# Patient Record
Sex: FEMALE | Race: WHITE | Employment: FULL TIME | URBAN - METROPOLITAN AREA
[De-identification: names, ages, dates, MRNs, and addresses within clinical notes are randomized per-mention and may not be internally consistent; named-entity substitution may affect disease eponyms.]

---

## 2017-05-14 ENCOUNTER — HOSPITAL ENCOUNTER (EMERGENCY)
Age: 47
Discharge: HOME | End: 2017-05-14
Payer: MEDICAID

## 2017-05-14 ENCOUNTER — APPOINTMENT (OUTPATIENT)
Dept: GENERAL RADIOLOGY | Age: 47
End: 2017-05-14
Payer: MEDICAID

## 2017-05-14 VITALS
BODY MASS INDEX: 23.79 KG/M2 | HEART RATE: 83 BPM | RESPIRATION RATE: 16 BRPM | OXYGEN SATURATION: 97 % | WEIGHT: 151.6 LBS | TEMPERATURE: 99.4 F | SYSTOLIC BLOOD PRESSURE: 130 MMHG | DIASTOLIC BLOOD PRESSURE: 70 MMHG | HEIGHT: 67 IN

## 2017-05-14 DIAGNOSIS — F31.9 BIPOLAR 1 DISORDER (HCC): ICD-10-CM

## 2017-05-14 DIAGNOSIS — S60.221A CONTUSION OF RIGHT HAND, INITIAL ENCOUNTER: Primary | ICD-10-CM

## 2017-05-14 LAB
ALBUMIN SERPL-MCNC: 4.8 G/DL (ref 3.9–4.9)
ALP BLD-CCNC: 46 U/L (ref 40–130)
ALT SERPL-CCNC: 17 U/L (ref 0–33)
AMPHETAMINE SCREEN, URINE: ABNORMAL
ANION GAP SERPL CALCULATED.3IONS-SCNC: 14 MEQ/L (ref 7–13)
AST SERPL-CCNC: 31 U/L (ref 0–35)
BACTERIA: ABNORMAL /HPF
BARBITURATE SCREEN URINE: ABNORMAL
BASOPHILS ABSOLUTE: 0.1 K/UL (ref 0–0.2)
BASOPHILS RELATIVE PERCENT: 0.6 %
BENZODIAZEPINE SCREEN, URINE: ABNORMAL
BILIRUB SERPL-MCNC: 0.3 MG/DL (ref 0–1.2)
BILIRUBIN URINE: NEGATIVE
BLOOD, URINE: ABNORMAL
BUN BLDV-MCNC: 7 MG/DL (ref 6–20)
CALCIUM SERPL-MCNC: 9.6 MG/DL (ref 8.6–10.2)
CANNABINOID SCREEN URINE: POSITIVE
CHLORIDE BLD-SCNC: 101 MEQ/L (ref 98–107)
CK MB: 7.6 NG/ML (ref 0–3.8)
CLARITY: CLEAR
CO2: 23 MEQ/L (ref 22–29)
COCAINE METABOLITE SCREEN URINE: ABNORMAL
COLOR: YELLOW
CREAT SERPL-MCNC: 0.48 MG/DL (ref 0.5–0.9)
CREATINE KINASE-MB INDEX: 1.2 % (ref 0–3.5)
EOSINOPHILS ABSOLUTE: 0.1 K/UL (ref 0–0.7)
EOSINOPHILS RELATIVE PERCENT: 1.1 %
EPITHELIAL CELLS, UA: ABNORMAL /HPF
ETHANOL PERCENT: NORMAL G/DL
ETHANOL: <10 MG/DL (ref 0–0.08)
GFR AFRICAN AMERICAN: >60
GFR NON-AFRICAN AMERICAN: >60
GLOBULIN: 2.1 G/DL (ref 2.3–3.5)
GLUCOSE BLD-MCNC: 112 MG/DL (ref 74–109)
GLUCOSE URINE: NEGATIVE MG/DL
HCG(URINE) PREGNANCY TEST: NEGATIVE
HCT VFR BLD CALC: 40 % (ref 37–47)
HEMOGLOBIN: 13.2 G/DL (ref 12–16)
KETONES, URINE: ABNORMAL MG/DL
LEUKOCYTE ESTERASE, URINE: ABNORMAL
LYMPHOCYTES ABSOLUTE: 1.7 K/UL (ref 1–4.8)
LYMPHOCYTES RELATIVE PERCENT: 14.8 %
Lab: ABNORMAL
MCH RBC QN AUTO: 29.5 PG (ref 27–31.3)
MCHC RBC AUTO-ENTMCNC: 33.1 % (ref 33–37)
MCV RBC AUTO: 89.2 FL (ref 82–100)
MONOCYTES ABSOLUTE: 1 K/UL (ref 0.2–0.8)
MONOCYTES RELATIVE PERCENT: 8.8 %
MUCUS: PRESENT
NEUTROPHILS ABSOLUTE: 8.8 K/UL (ref 1.4–6.5)
NEUTROPHILS RELATIVE PERCENT: 74.7 %
NITRITE, URINE: NEGATIVE
OPIATE SCREEN URINE: ABNORMAL
PDW BLD-RTO: 15.3 % (ref 11.5–14.5)
PH UA: 6 (ref 5–9)
PHENCYCLIDINE SCREEN URINE: ABNORMAL
PLATELET # BLD: 212 K/UL (ref 130–400)
POTASSIUM SERPL-SCNC: 3.6 MEQ/L (ref 3.5–5.1)
PROTEIN UA: NEGATIVE MG/DL
RBC # BLD: 4.48 M/UL (ref 4.2–5.4)
RBC UA: ABNORMAL /HPF (ref 0–2)
SODIUM BLD-SCNC: 138 MEQ/L (ref 132–144)
SPECIFIC GRAVITY UA: 1.01 (ref 1–1.03)
TOTAL CK: 660 U/L (ref 0–170)
TOTAL PROTEIN: 6.9 G/DL (ref 6.4–8.1)
TSH SERPL DL<=0.05 MIU/L-ACNC: 2.43 UIU/ML (ref 0.27–4.2)
URINE REFLEX TO CULTURE: YES
UROBILINOGEN, URINE: 0.2 E.U./DL
WBC # BLD: 11.8 K/UL (ref 4.8–10.8)
WBC UA: ABNORMAL /HPF (ref 0–5)

## 2017-05-14 PROCEDURE — 99283 EMERGENCY DEPT VISIT LOW MDM: CPT

## 2017-05-14 PROCEDURE — 82553 CREATINE MB FRACTION: CPT

## 2017-05-14 PROCEDURE — 81001 URINALYSIS AUTO W/SCOPE: CPT

## 2017-05-14 PROCEDURE — 80053 COMPREHEN METABOLIC PANEL: CPT

## 2017-05-14 PROCEDURE — 85025 COMPLETE CBC W/AUTO DIFF WBC: CPT

## 2017-05-14 PROCEDURE — 84703 CHORIONIC GONADOTROPIN ASSAY: CPT

## 2017-05-14 PROCEDURE — G0480 DRUG TEST DEF 1-7 CLASSES: HCPCS

## 2017-05-14 PROCEDURE — 84443 ASSAY THYROID STIM HORMONE: CPT

## 2017-05-14 PROCEDURE — 87086 URINE CULTURE/COLONY COUNT: CPT

## 2017-05-14 PROCEDURE — 73130 X-RAY EXAM OF HAND: CPT

## 2017-05-14 PROCEDURE — 82550 ASSAY OF CK (CPK): CPT

## 2017-05-14 PROCEDURE — 80307 DRUG TEST PRSMV CHEM ANLYZR: CPT

## 2017-05-14 PROCEDURE — 36415 COLL VENOUS BLD VENIPUNCTURE: CPT

## 2017-05-14 ASSESSMENT — ENCOUNTER SYMPTOMS
ALLERGIC/IMMUNOLOGIC NEGATIVE: 1
ABDOMINAL PAIN: 0
COLOR CHANGE: 0
EYE PAIN: 0
SHORTNESS OF BREATH: 0
TROUBLE SWALLOWING: 0
APNEA: 0

## 2017-05-14 ASSESSMENT — PAIN DESCRIPTION - LOCATION: LOCATION: HAND

## 2017-05-14 ASSESSMENT — PAIN DESCRIPTION - ORIENTATION: ORIENTATION: RIGHT

## 2017-05-14 ASSESSMENT — PAIN DESCRIPTION - PAIN TYPE: TYPE: ACUTE PAIN

## 2017-05-14 ASSESSMENT — PAIN SCALES - GENERAL: PAINLEVEL_OUTOF10: 5

## 2017-05-16 LAB — URINE CULTURE, ROUTINE: NORMAL

## 2017-05-18 ENCOUNTER — OFFICE VISIT (OUTPATIENT)
Dept: FAMILY MEDICINE CLINIC | Age: 47
End: 2017-05-18

## 2017-05-18 VITALS
BODY MASS INDEX: 23.7 KG/M2 | OXYGEN SATURATION: 97 % | DIASTOLIC BLOOD PRESSURE: 68 MMHG | SYSTOLIC BLOOD PRESSURE: 122 MMHG | WEIGHT: 151 LBS | HEIGHT: 67 IN | HEART RATE: 95 BPM | TEMPERATURE: 97.5 F

## 2017-05-18 DIAGNOSIS — Z01.419 ENCOUNTER FOR GYNECOLOGICAL EXAMINATION WITH PAPANICOLAOU SMEAR OF CERVIX: ICD-10-CM

## 2017-05-18 DIAGNOSIS — N89.8 VAGINAL ITCHING: ICD-10-CM

## 2017-05-18 DIAGNOSIS — F31.9 BIPOLAR 1 DISORDER (HCC): Primary | ICD-10-CM

## 2017-05-18 LAB
CLUE CELLS: NORMAL
TRICHOMONAS PREP: NORMAL
TRICHOMONAS VAGINALIS SCREEN: NEGATIVE
YEAST WET PREP: NORMAL

## 2017-05-18 PROCEDURE — 99212 OFFICE O/P EST SF 10 MIN: CPT | Performed by: NURSE PRACTITIONER

## 2017-05-18 RX ORDER — QUETIAPINE FUMARATE 25 MG/1
25 TABLET, FILM COATED ORAL 2 TIMES DAILY
Qty: 60 TABLET | Refills: 3 | Status: SHIPPED | OUTPATIENT
Start: 2017-05-18 | End: 2017-09-15 | Stop reason: SDUPTHER

## 2017-05-18 ASSESSMENT — ENCOUNTER SYMPTOMS: COUGH: 0

## 2017-05-25 LAB
C. TRACHOMATIS DNA,THIN PREP: NEGATIVE
N. GONORRHOEAE DNA, THIN PREP: NEGATIVE

## 2017-05-31 LAB
HPV COMMENT: NORMAL
HPV TYPE 16: NOT DETECTED
HPV TYPE 18: NOT DETECTED
HPVOH (OTHER TYPES): NOT DETECTED

## 2017-06-08 ENCOUNTER — OFFICE VISIT (OUTPATIENT)
Dept: FAMILY MEDICINE CLINIC | Age: 47
End: 2017-06-08

## 2017-06-08 VITALS
TEMPERATURE: 98.4 F | HEART RATE: 75 BPM | OXYGEN SATURATION: 98 % | DIASTOLIC BLOOD PRESSURE: 58 MMHG | BODY MASS INDEX: 23.7 KG/M2 | SYSTOLIC BLOOD PRESSURE: 100 MMHG | WEIGHT: 151 LBS | HEIGHT: 67 IN

## 2017-06-08 DIAGNOSIS — Z12.39 BREAST CANCER SCREENING: ICD-10-CM

## 2017-06-08 DIAGNOSIS — F31.9 BIPOLAR 1 DISORDER (HCC): Primary | ICD-10-CM

## 2017-06-08 PROCEDURE — 99213 OFFICE O/P EST LOW 20 MIN: CPT | Performed by: NURSE PRACTITIONER

## 2017-06-08 ASSESSMENT — ENCOUNTER SYMPTOMS
COUGH: 0
SHORTNESS OF BREATH: 0

## 2017-06-23 ENCOUNTER — HOSPITAL ENCOUNTER (OUTPATIENT)
Dept: WOMENS IMAGING | Age: 47
Discharge: HOME | End: 2017-06-23
Payer: COMMERCIAL

## 2017-06-23 DIAGNOSIS — Z12.39 BREAST CANCER SCREENING: ICD-10-CM

## 2017-06-23 PROCEDURE — G0202 SCR MAMMO BI INCL CAD: HCPCS

## 2017-06-27 ENCOUNTER — TELEPHONE (OUTPATIENT)
Dept: FAMILY MEDICINE CLINIC | Age: 47
End: 2017-06-27

## 2017-06-27 DIAGNOSIS — R92.8 ABNORMALITY OF BOTH BREASTS ON SCREENING MAMMOGRAM: Primary | ICD-10-CM

## 2017-06-29 ENCOUNTER — HOSPITAL ENCOUNTER (OUTPATIENT)
Dept: WOMENS IMAGING | Age: 47
Discharge: HOME | End: 2017-06-29
Payer: COMMERCIAL

## 2017-06-29 ENCOUNTER — HOSPITAL ENCOUNTER (OUTPATIENT)
Dept: ULTRASOUND IMAGING | Age: 47
Discharge: HOME | End: 2017-06-29
Payer: COMMERCIAL

## 2017-06-29 DIAGNOSIS — R92.8 ABNORMALITY OF BOTH BREASTS ON SCREENING MAMMOGRAM: ICD-10-CM

## 2017-06-29 PROCEDURE — 76642 ULTRASOUND BREAST LIMITED: CPT

## 2017-06-29 PROCEDURE — G0204 DX MAMMO INCL CAD BI: HCPCS

## 2017-09-15 DIAGNOSIS — F31.9 BIPOLAR 1 DISORDER (HCC): ICD-10-CM

## 2017-09-15 RX ORDER — QUETIAPINE FUMARATE 25 MG/1
25 TABLET, FILM COATED ORAL 2 TIMES DAILY
Qty: 60 TABLET | Refills: 0 | Status: SHIPPED | OUTPATIENT
Start: 2017-09-15 | End: 2017-10-23 | Stop reason: SDUPTHER

## 2017-09-19 ENCOUNTER — TELEPHONE (OUTPATIENT)
Dept: FAMILY MEDICINE CLINIC | Age: 47
End: 2017-09-19

## 2017-10-23 DIAGNOSIS — F31.9 BIPOLAR 1 DISORDER (HCC): ICD-10-CM

## 2017-10-23 RX ORDER — QUETIAPINE FUMARATE 25 MG/1
25 TABLET, FILM COATED ORAL 2 TIMES DAILY
Qty: 60 TABLET | Refills: 0 | Status: SHIPPED | OUTPATIENT
Start: 2017-10-23 | End: 2017-11-16 | Stop reason: SDUPTHER

## 2017-11-16 DIAGNOSIS — F31.9 BIPOLAR 1 DISORDER (HCC): ICD-10-CM

## 2017-11-17 RX ORDER — QUETIAPINE FUMARATE 25 MG/1
25 TABLET, FILM COATED ORAL 2 TIMES DAILY
Qty: 60 TABLET | Refills: 0 | Status: SHIPPED | OUTPATIENT
Start: 2017-11-17 | End: 2018-09-27 | Stop reason: DRUGHIGH

## 2018-09-27 ENCOUNTER — OFFICE VISIT (OUTPATIENT)
Dept: FAMILY MEDICINE CLINIC | Age: 48
End: 2018-09-27

## 2018-09-27 VITALS
SYSTOLIC BLOOD PRESSURE: 104 MMHG | BODY MASS INDEX: 24.96 KG/M2 | WEIGHT: 159 LBS | HEART RATE: 88 BPM | OXYGEN SATURATION: 98 % | TEMPERATURE: 97.8 F | HEIGHT: 67 IN | DIASTOLIC BLOOD PRESSURE: 62 MMHG

## 2018-09-27 DIAGNOSIS — J01.40 ACUTE NON-RECURRENT PANSINUSITIS: Primary | ICD-10-CM

## 2018-09-27 DIAGNOSIS — S29.012A STRAIN OF MUSCLE AND TENDON OF BACK WALL OF THORAX, INITIAL ENCOUNTER: ICD-10-CM

## 2018-09-27 PROCEDURE — 99213 OFFICE O/P EST LOW 20 MIN: CPT | Performed by: NURSE PRACTITIONER

## 2018-09-27 RX ORDER — METHYLPREDNISOLONE 4 MG/1
TABLET ORAL
Qty: 21 TABLET | Refills: 0 | Status: SHIPPED | OUTPATIENT
Start: 2018-09-27 | End: 2018-10-03

## 2018-09-27 RX ORDER — DULOXETIN HYDROCHLORIDE 30 MG/1
30 CAPSULE, DELAYED RELEASE ORAL DAILY
COMMUNITY
End: 2021-10-08

## 2018-09-27 RX ORDER — AMOXICILLIN AND CLAVULANATE POTASSIUM 875; 125 MG/1; MG/1
1 TABLET, FILM COATED ORAL 2 TIMES DAILY
Qty: 20 TABLET | Refills: 0 | Status: SHIPPED | OUTPATIENT
Start: 2018-09-27 | End: 2018-10-07

## 2018-09-27 RX ORDER — QUETIAPINE FUMARATE 50 MG/1
50 TABLET, EXTENDED RELEASE ORAL DAILY
COMMUNITY
End: 2018-12-20 | Stop reason: ALTCHOICE

## 2018-09-27 RX ORDER — DULOXETIN HYDROCHLORIDE 60 MG/1
90 CAPSULE, DELAYED RELEASE ORAL DAILY
COMMUNITY

## 2018-09-27 RX ORDER — CYCLOBENZAPRINE HCL 10 MG
10 TABLET ORAL NIGHTLY PRN
Qty: 10 TABLET | Refills: 0 | Status: SHIPPED | OUTPATIENT
Start: 2018-09-27 | End: 2018-10-07

## 2018-09-27 ASSESSMENT — ENCOUNTER SYMPTOMS
WHEEZING: 0
SHORTNESS OF BREATH: 0
COUGH: 1
RHINORRHEA: 1
BACK PAIN: 1
BOWEL INCONTINENCE: 0

## 2018-09-27 ASSESSMENT — PATIENT HEALTH QUESTIONNAIRE - PHQ9
SUM OF ALL RESPONSES TO PHQ QUESTIONS 1-9: 0
1. LITTLE INTEREST OR PLEASURE IN DOING THINGS: 0
SUM OF ALL RESPONSES TO PHQ9 QUESTIONS 1 & 2: 0
2. FEELING DOWN, DEPRESSED OR HOPELESS: 0
SUM OF ALL RESPONSES TO PHQ QUESTIONS 1-9: 0

## 2018-09-27 NOTE — PROGRESS NOTES
Subjective  Chief Complaint   Patient presents with    Cough     states that she has had the cough about a week. was trying sinus/cold medication with no relief.  Back Pain     states that she hurt her back at home yesterday lifting a window up. states that right now she is having pain between her shoulder blades. states that she used heat last night and it is not helping did not take any meds.  Other     passed depression screening. Cough   This is a new problem. The current episode started in the past 7 days (started 1 week ago after dusting and going through old stuff in her parent's house). The problem has been unchanged. The problem occurs constantly. The cough is non-productive. Associated symptoms include nasal congestion, postnasal drip and rhinorrhea. Pertinent negatives include no chest pain, chills, fever, shortness of breath or wheezing. The symptoms are aggravated by dust. Risk factors for lung disease include smoking/tobacco exposure. Treatments tried: sinus/cold medicine. The treatment provided mild relief. Back Pain   This is a new problem. The current episode started yesterday. The problem occurs constantly. The problem is unchanged. The pain is present in the thoracic spine. The pain does not radiate (muscle pain between shoulder blades, left collar bone area). Exacerbated by: lifting arms overhead. Pertinent negatives include no bladder incontinence, bowel incontinence, chest pain, fever, leg pain, paresis, paresthesias or weakness. She has tried heat (hot shower and heating pad) for the symptoms. The treatment provided moderate relief.            Past Medical History:   Diagnosis Date    [facility]29[facility]2016    Hemangioma 2016    ct abdomen, left lobe    Liver lesion, right lobe 2007, 2016    stable between 2 readings     Patient Active Problem List    Diagnosis Date Noted    Bipolar 1 disorder ([facility] 06/29/2016    Liver lesion, right lobe     Hemangioma 88   Temp: 97.8 °F (36.6 °C)   TempSrc: Tympanic   SpO2: 98%   Weight: 159 lb (72.1 kg)   Height: 5' 7.25\" (1.708 m)     Physical Exam   Constitutional: She is oriented to person, place, and time. She appears well-developed and well-nourished. No distress. HENT:   Head: Normocephalic and atraumatic. Right Ear: Tympanic membrane, external ear and ear canal normal.   Left Ear: Tympanic membrane, external ear and ear canal normal.   Nose: Mucosal edema present. Right sinus exhibits maxillary sinus tenderness and frontal sinus tenderness. Left sinus exhibits maxillary sinus tenderness and frontal sinus tenderness. Mouth/Throat: Oropharynx is clear and moist. No oropharyngeal exudate. Eyes: Pupils are equal, round, and reactive to light. Conjunctivae are normal.   Neck: Normal range of motion. Neck supple. Cardiovascular: Normal rate, regular rhythm and normal heart sounds. Pulmonary/Chest: Effort normal and breath sounds normal. No respiratory distress. Musculoskeletal:        Thoracic back: She exhibits decreased range of motion, tenderness (muscular), pain and spasm. She exhibits no bony tenderness, no swelling, no edema, no deformity, no laceration and normal pulse. Lymphadenopathy:     She has no cervical adenopathy. Neurological: She is alert and oriented to person, place, and time. No cranial nerve deficit. Skin: Skin is warm and dry. No rash noted. She is not diaphoretic. No erythema. No pallor. Psychiatric: She has a normal mood and affect. Her behavior is normal. Judgment and thought content normal.       Assessment & Plan    1. Acute non-recurrent pansinusitis  Pt advised to rest and drink plenty of fluids. Finish all antibiotics even if feeling better. OTC analgesics for symptom management. Salt water gargle for sore throat. RTO if symptoms worsen or if no improvement in 72 hours. - amoxicillin-clavulanate (AUGMENTIN) 875-125 MG per tablet;  Take 1 tablet by mouth 2 times daily for 10 days  Dispense: 20 tablet; Refill: 0    2. Strain of muscle and tendon of back wall of thorax, initial encounter  Medrol dosepack as ordered. Muscle relaxer PRN sparingly. Ice to area PRN. Stretching exercises. Avoid bedrest.  No heavy lifting. F/u if no improvement. - methylPREDNISolone (MEDROL DOSEPACK) 4 MG tablet; Take by mouth. Dispense: 21 tablet; Refill: 0  - cyclobenzaprine (FLEXERIL) 10 MG tablet; Take 1 tablet by mouth nightly as needed for Muscle spasms  Dispense: 10 tablet; Refill: 0    Side effects, adverse effects of the medication prescribed today, as well as treatment plan/ rationale and result expectations have been discussed with the patient who expresses understanding and desires to proceed. Close follow up to evaluate treatment results and for coordination of care. I have reviewed the patient's medical history in detail and updated the computerized patient record. As always, patient is advised that if symptoms worsen in any way they will proceed to the nearest emergency room. No orders of the defined types were placed in this encounter. Orders Placed This Encounter   Medications    amoxicillin-clavulanate (AUGMENTIN) 875-125 MG per tablet     Sig: Take 1 tablet by mouth 2 times daily for 10 days     Dispense:  20 tablet     Refill:  0    methylPREDNISolone (MEDROL DOSEPACK) 4 MG tablet     Sig: Take by mouth. Dispense:  21 tablet     Refill:  0    cyclobenzaprine (FLEXERIL) 10 MG tablet     Sig: Take 1 tablet by mouth nightly as needed for Muscle spasms     Dispense:  10 tablet     Refill:  0       Return if symptoms worsen or fail to improve.     LUZ Cox - CNP

## 2018-10-11 ENCOUNTER — OFFICE VISIT (OUTPATIENT)
Dept: FAMILY MEDICINE CLINIC | Age: 48
End: 2018-10-11

## 2018-10-11 VITALS
DIASTOLIC BLOOD PRESSURE: 62 MMHG | BODY MASS INDEX: 25.43 KG/M2 | HEIGHT: 67 IN | OXYGEN SATURATION: 99 % | WEIGHT: 162 LBS | SYSTOLIC BLOOD PRESSURE: 108 MMHG | HEART RATE: 86 BPM

## 2018-10-11 DIAGNOSIS — M54.6 MIDLINE THORACIC BACK PAIN, UNSPECIFIED CHRONICITY: ICD-10-CM

## 2018-10-11 PROCEDURE — 99213 OFFICE O/P EST LOW 20 MIN: CPT | Performed by: FAMILY MEDICINE

## 2018-10-11 RX ORDER — IBUPROFEN 200 MG
200 TABLET ORAL 4 TIMES DAILY PRN
COMMUNITY
End: 2019-10-31

## 2018-10-11 ASSESSMENT — ENCOUNTER SYMPTOMS
BACK PAIN: 1
ABDOMINAL PAIN: 0
BOWEL INCONTINENCE: 0

## 2018-10-11 NOTE — PATIENT INSTRUCTIONS
Patient Education        Healthy Upper Back: Exercises  Your Care Instructions  Here are some examples of exercises for your upper back. Start each exercise slowly. Ease off the exercise if you start to have pain. Your doctor or physical therapist will tell you when you can start these exercises and which ones will work best for you. How to do the exercises  Lower neck and upper back stretch    1. Stretch your arms out in front of your body. Clasp one hand on top of your other hand. 2. Gently reach out so that you feel your shoulder blades stretching away from each other. 3. Gently bend your head forward. 4. Hold for 15 to 30 seconds. 5. Repeat 2 to 4 times. Midback stretch    If you have knee pain, do not do this exercise. 1. Kneel on the floor, and sit back on your ankles. 2. Lean forward, place your hands on the floor, and stretch your arms out in front of you. Rest your head between your arms. 3. Gently push your chest toward the floor, reaching as far in front of you as possible. 4. Hold for 15 to 30 seconds. 5. Repeat 2 to 4 times. Shoulder rolls    1. Sit comfortably with your feet shoulder-width apart. You can also do this exercise while standing. 2. Roll your shoulders up, then back, and then down in a smooth, circular motion. 3. Repeat 2 to 4 times. Wall push-up    1. Stand against a wall with your feet about 12 to 24 inches back from the wall. If you feel any pain when you do this exercise, stand closer to the wall. 2. Place your hands on the wall slightly wider apart than your shoulders, and lean forward. 3. Gently lean your body toward the wall. Then push back to your starting position. Keep the motion smooth and controlled. 4. Repeat 8 to 12 times. Resisted shoulder blade squeeze    For this exercise, you will need elastic exercise material, such as surgical tubing or Thera-Band. 1. Sit or stand, holding the band in both hands in front of you.  Keep your elbows close to your sides, bent at a 90-degree angle. Your palms should face up. 2. Squeeze your shoulder blades together, and move your arms to the outside, stretching the band. Be sure to keep your elbows at your sides while you do this. 3. Relax. 4. Repeat 8 to 12 times. Resisted rows    For this exercise, you will need elastic exercise material, such as surgical tubing or Thera-Band. 1. Put the band around a solid object, such as a bedpost, at about waist level. Hold one end of the band in each hand. 2. With your elbows at your sides and bent to 90 degrees, pull the band back to move your shoulder blades toward each other. Return to the starting position. 3. Repeat 8 to 12 times. Follow-up care is a key part of your treatment and safety. Be sure to make and go to all appointments, and call your doctor if you are having problems. It's also a good idea to know your test results and keep a list of the medicines you take. Where can you learn more? Go to [email or web address] org and sign in to your [employer] account. Enter V778 in the Snocap box to learn more about \"Healthy Upper Back: Exercises. \"     If you do not have an account, please click on the \"Sign Up Now\" link. Current as of: November 29, 2017  Content Version: 11.7  © 8228-4296 [employer]. Care instructions adapted under license by [facility] [place] ([facility]). If you have questions about a medical condition or this instruction, always ask your healthcare professional. [place] [address] any warranty or liability for your use of this information.

## 2018-10-11 NOTE — PROGRESS NOTES
Subjective  Lorene Carlos Sheikh, 50 y.o. female presents today with:  Chief Complaint   Patient presents with    Back Pain     between should blades/ hurts after work when she tries to relax       Back Pain   This is a new problem. The current episode started 1 to 4 weeks ago. The problem occurs intermittently. The problem has been waxing and waning since onset. The pain is present in the thoracic spine. The quality of the pain is described as burning and stabbing. The pain does not radiate. The pain is at a severity of 10/10. The pain is severe. Exacerbated by: movement above her head. Pertinent negatives include no abdominal pain, bladder incontinence, bowel incontinence, chest pain or dysuria. She has tried NSAIDs for the symptoms. The treatment provided no relief. Mid back pain started end of Sept. Saw Margret Lyman end of Sept and was given rx for MDP but did not take it. Took muscle relaxer but not helping.,     Review of Systems   Cardiovascular: Negative for chest pain. Gastrointestinal: Negative for abdominal pain and bowel incontinence. Genitourinary: Negative for bladder incontinence and dysuria. Musculoskeletal: Positive for back pain. Past Medical History:   Diagnosis Date    Bipolar 1 disorder ([facility] 6/29/2016    Hemangioma 2016    ct abdomen, left lobe    Liver lesion, right lobe 2007, 2016    stable between 2 readings    Thoracic back pain      Past Surgical History:   Procedure Laterality Date    APPENDECTOMY  6/10/16    laparoscopic Dr Spann Bias COLONOSCOPY  06/27/2016    w/bx     CYST REMOVAL      DENTAL SURGERY      NECK SURGERY      TUBAL LIGATION       Social History     Social History    Marital status:      Spouse name: N/A    Number of children: N/A    Years of education: N/A     Occupational History    Not on file.      Social History Main Topics    Smoking status: Current Every Day Smoker     Packs/day: 1.00     Types: Cigarettes    Smokeless tobacco: at this time. She was given home back exercises to do. She will call in 2 weeks if not better and will proceed with imaging. Needs work excuse for no overhead lifting or pushing/lifting over 25 pounds at all for 2 weeks. No orders of the defined types were placed in this encounter. No orders of the defined types were placed in this encounter. There are no discontinued medications. Return if symptoms worsen or fail to improve.     Carlton Cruz MD

## 2018-12-20 ENCOUNTER — OFFICE VISIT (OUTPATIENT)
Dept: FAMILY MEDICINE CLINIC | Age: 48
End: 2018-12-20

## 2018-12-20 VITALS
DIASTOLIC BLOOD PRESSURE: 70 MMHG | TEMPERATURE: 97.9 F | HEART RATE: 92 BPM | WEIGHT: 162.5 LBS | SYSTOLIC BLOOD PRESSURE: 110 MMHG | RESPIRATION RATE: 15 BRPM | HEIGHT: 67 IN | BODY MASS INDEX: 25.51 KG/M2 | OXYGEN SATURATION: 98 %

## 2018-12-20 DIAGNOSIS — R00.1 BRADYCARDIA: ICD-10-CM

## 2018-12-20 DIAGNOSIS — R00.1 BRADYCARDIA: Primary | ICD-10-CM

## 2018-12-20 LAB — TSH REFLEX: 1.97 UIU/ML (ref 0.27–4.2)

## 2018-12-20 PROCEDURE — 99214 OFFICE O/P EST MOD 30 MIN: CPT | Performed by: INTERNAL MEDICINE

## 2018-12-20 PROCEDURE — 93000 ELECTROCARDIOGRAM COMPLETE: CPT | Performed by: INTERNAL MEDICINE

## 2018-12-20 RX ORDER — ARIPIPRAZOLE 5 MG/1
5 TABLET ORAL DAILY
COMMUNITY
End: 2019-10-31

## 2018-12-20 ASSESSMENT — ENCOUNTER SYMPTOMS
ABDOMINAL PAIN: 0
BACK PAIN: 0
SHORTNESS OF BREATH: 0
EYE PAIN: 0

## 2018-12-20 NOTE — PROGRESS NOTES
Subjective:      Patient ID: Marce Baumann is a 50 y.o. female who presents today with:  Chief Complaint   Patient presents with    Bradycardia     low heart rate (38, 42, 41) readings from Tuesday, patient states she feels ok, no chest pain, no dizziness. HPI    Here because she had an episode of bradycardia at [facility]. She endorses fatigue. But no lightheadedness, dizziness, passing out, or near passing out. She felt \"fine\" when her pulse was in the 40's. This was taken both manually and through the pulse oximetry. No new medications or changes. THC, and alcohol use, but no other illicit drug use. Past Medical History:   Diagnosis Date    Bipolar 1 disorder ([facility] 6/29/2016    Hemangioma 2016    ct abdomen, left lobe    Liver lesion, right lobe 2007, 2016    stable between 2 readings    Thoracic back pain      Past Surgical History:   Procedure Laterality Date    APPENDECTOMY  6/10/16    laparoscopic Dr Hortencia Rosario COLONOSCOPY  06/27/2016    w/bx     CYST REMOVAL      DENTAL SURGERY      NECK SURGERY      TUBAL LIGATION       Social History     Social History    Marital status:      Spouse name: N/A    Number of children: N/A    Years of education: N/A     Occupational History    Not on file.      Social History Main Topics    Smoking status: Current Every Day Smoker     Packs/day: 1.00     Types: Cigarettes    Smokeless tobacco: Never Used    Alcohol use Yes      Comment: occasionally    Drug use: Yes     Types: Marijuana    Sexual activity: Not on file     Other Topics Concern    Not on file     Social History Narrative    No narrative on file     Allergies   Allergen Reactions    Demerol     Sulfa Antibiotics      Current Outpatient Prescriptions on File Prior to Visit   Medication Sig Dispense Refill    ibuprofen (ADVIL;MOTRIN) 200 MG tablet Take 200 mg by mouth 4 times daily as needed for Pain      DULoxetine (CYMBALTA) 60 MG extended release capsule Take 60 mg by mouth daily      DULoxetine (CYMBALTA) 30 MG extended release capsule Take 30 mg by mouth daily      Multiple Vitamins-Minerals (MULTI-VITAMIN GUMMIES PO) Take by mouth       No current facility-administered medications on file prior to visit. I have personally reviewed the ROS, PMH, PFH, and social history     Review of Systems   Constitutional: Negative for chills and fever. HENT: Negative for congestion. Eyes: Negative for pain. Respiratory: Negative for shortness of breath. Cardiovascular: Negative for chest pain. Gastrointestinal: Negative for abdominal pain. Genitourinary: Negative for hematuria. Musculoskeletal: Negative for back pain. Allergic/Immunologic: Negative for immunocompromised state. Neurological: Negative for headaches. Psychiatric/Behavioral: Negative for hallucinations. Objective:   /70 (Site: Left Upper Arm, Position: Sitting, Cuff Size: Large Adult)   Pulse 92   Temp 97.9 °F (36.6 °C) (Tympanic)   Resp 15   Ht 5' 7\" (1.702 m)   Wt 162 lb 8 oz (73.7 kg)   LMP 12/01/2018 (Approximate)   SpO2 98%   BMI 25.45 kg/m²     Physical Exam   Constitutional: She is oriented to person, place, and time. She appears well-developed and well-nourished. HENT:   Head: Normocephalic. Eyes: Pupils are equal, round, and reactive to light. Neck: No tracheal deviation present. Cardiovascular: Normal rate, regular rhythm and normal heart sounds. Exam reveals no gallop and no friction rub. No murmur heard. Pulmonary/Chest: No respiratory distress. Abdominal: Soft. Bowel sounds are normal. She exhibits no distension. There is no rebound. Musculoskeletal: She exhibits no edema. Neurological: She is oriented to person, place, and time. Skin: Skin is warm and dry. Assessment:       Diagnosis Orders   1.  Bradycardia  [address] [facility]    TSH with Reflex    EKG 12 Lead         Plan:    Given lack of insurance will refer to cardiology to see what the most cost effective step is. If any red flag symptoms (cp, dyspnea, passing out, near passing out, lightheadedness, etc, go to ER)  She understands  Check TSH. She's asx. Orders Placed This Encounter   Procedures    TSH with Reflex     Standing Status:   Future     Standing Expiration Date:   12/20/2019   Moy Suazo, [address] [facility]     Referral Priority:   Routine     Referral Type:   Eval and Treat     Referral Reason:   Specialty Services Required     Referred to Provider:   Torrie Lawrence MD     Requested Specialty:   Interventional Cardiology     Number of Visits Requested:   1    EKG 12 Lead     Order Specific Question:   Reason for Exam?     Answer:   Irregular heart rate     No orders of the defined types were placed in this encounter. No Follow-up on file. Sheree Aguilar MD    If anything should change or worsen call ASAP, don't wait for next scheduled appointment.

## 2018-12-26 ENCOUNTER — OFFICE VISIT (OUTPATIENT)
Dept: CARDIOLOGY CLINIC | Age: 48
End: 2018-12-26

## 2018-12-26 VITALS
HEIGHT: 67 IN | WEIGHT: 164.4 LBS | BODY MASS INDEX: 25.8 KG/M2 | DIASTOLIC BLOOD PRESSURE: 70 MMHG | HEART RATE: 77 BPM | SYSTOLIC BLOOD PRESSURE: 120 MMHG | OXYGEN SATURATION: 98 %

## 2018-12-26 DIAGNOSIS — R00.1 BRADYCARDIA: Primary | ICD-10-CM

## 2018-12-26 PROCEDURE — 99244 OFF/OP CNSLTJ NEW/EST MOD 40: CPT | Performed by: INTERNAL MEDICINE

## 2019-01-01 ASSESSMENT — ENCOUNTER SYMPTOMS
CHEST TIGHTNESS: 0
CHOKING: 0
ABDOMINAL DISTENTION: 0
BACK PAIN: 0
ABDOMINAL PAIN: 0
APNEA: 0
COLOR CHANGE: 0

## 2019-02-22 ENCOUNTER — OFFICE VISIT (OUTPATIENT)
Dept: FAMILY MEDICINE CLINIC | Age: 49
End: 2019-02-22

## 2019-02-22 VITALS
DIASTOLIC BLOOD PRESSURE: 72 MMHG | HEART RATE: 69 BPM | SYSTOLIC BLOOD PRESSURE: 118 MMHG | TEMPERATURE: 98 F | WEIGHT: 167 LBS | HEIGHT: 67 IN | BODY MASS INDEX: 26.21 KG/M2 | OXYGEN SATURATION: 97 %

## 2019-02-22 DIAGNOSIS — J01.00 ACUTE MAXILLARY SINUSITIS, RECURRENCE NOT SPECIFIED: Primary | ICD-10-CM

## 2019-02-22 PROCEDURE — 99212 OFFICE O/P EST SF 10 MIN: CPT | Performed by: NURSE PRACTITIONER

## 2019-02-22 RX ORDER — AMOXICILLIN AND CLAVULANATE POTASSIUM 875; 125 MG/1; MG/1
1 TABLET, FILM COATED ORAL 2 TIMES DAILY
Qty: 20 TABLET | Refills: 0 | Status: SHIPPED | OUTPATIENT
Start: 2019-02-22 | End: 2019-03-04

## 2019-02-22 ASSESSMENT — ENCOUNTER SYMPTOMS
SINUS PAIN: 1
HEMOPTYSIS: 0
SINUS PRESSURE: 1
SHORTNESS OF BREATH: 1
COUGH: 1
CHEST TIGHTNESS: 0
WHEEZING: 0
SORE THROAT: 0
HEARTBURN: 0
RHINORRHEA: 1

## 2019-10-31 ENCOUNTER — OFFICE VISIT (OUTPATIENT)
Dept: FAMILY MEDICINE CLINIC | Age: 49
End: 2019-10-31

## 2019-10-31 VITALS
SYSTOLIC BLOOD PRESSURE: 114 MMHG | DIASTOLIC BLOOD PRESSURE: 60 MMHG | BODY MASS INDEX: 26.12 KG/M2 | WEIGHT: 166.4 LBS | HEART RATE: 98 BPM | RESPIRATION RATE: 14 BRPM | HEIGHT: 67 IN | OXYGEN SATURATION: 96 % | TEMPERATURE: 98.8 F

## 2019-10-31 DIAGNOSIS — N93.8 DUB (DYSFUNCTIONAL UTERINE BLEEDING): ICD-10-CM

## 2019-10-31 DIAGNOSIS — N92.1 MENOMETRORRHAGIA: Primary | ICD-10-CM

## 2019-10-31 PROCEDURE — 99213 OFFICE O/P EST LOW 20 MIN: CPT | Performed by: NURSE PRACTITIONER

## 2019-10-31 ASSESSMENT — ENCOUNTER SYMPTOMS
DIARRHEA: 0
SHORTNESS OF BREATH: 0
ABDOMINAL DISTENTION: 0
CONSTIPATION: 0
COUGH: 0
ABDOMINAL PAIN: 0

## 2019-11-01 ENCOUNTER — TELEPHONE (OUTPATIENT)
Dept: FAMILY MEDICINE CLINIC | Age: 49
End: 2019-11-01

## 2019-11-01 ENCOUNTER — HOSPITAL ENCOUNTER (OUTPATIENT)
Dept: ULTRASOUND IMAGING | Age: 49
Discharge: HOME | End: 2019-11-03

## 2019-11-01 DIAGNOSIS — N92.1 MENOMETRORRHAGIA: Primary | ICD-10-CM

## 2019-11-01 DIAGNOSIS — N92.1 MENOMETRORRHAGIA: ICD-10-CM

## 2019-11-01 DIAGNOSIS — N93.8 DUB (DYSFUNCTIONAL UTERINE BLEEDING): ICD-10-CM

## 2019-11-01 PROCEDURE — 76830 TRANSVAGINAL US NON-OB: CPT

## 2019-11-01 PROCEDURE — 76856 US EXAM PELVIC COMPLETE: CPT

## 2019-11-05 DIAGNOSIS — D25.9 UTERINE LEIOMYOMA, UNSPECIFIED LOCATION: Primary | ICD-10-CM

## 2019-11-05 DIAGNOSIS — N93.8 DUB (DYSFUNCTIONAL UTERINE BLEEDING): ICD-10-CM

## 2019-11-05 DIAGNOSIS — N83.209 CYST OF OVARY, UNSPECIFIED LATERALITY: ICD-10-CM

## 2019-12-06 ENCOUNTER — OFFICE VISIT (OUTPATIENT)
Dept: OBGYN CLINIC | Age: 49
End: 2019-12-06

## 2019-12-06 VITALS — BODY MASS INDEX: 25.53 KG/M2 | DIASTOLIC BLOOD PRESSURE: 70 MMHG | WEIGHT: 163 LBS | SYSTOLIC BLOOD PRESSURE: 122 MMHG

## 2019-12-06 DIAGNOSIS — Z12.31 SCREENING MAMMOGRAM, ENCOUNTER FOR: ICD-10-CM

## 2019-12-06 DIAGNOSIS — D25.9 UTERINE LEIOMYOMA, UNSPECIFIED LOCATION: ICD-10-CM

## 2019-12-06 DIAGNOSIS — N83.202 LEFT OVARIAN CYST: Primary | ICD-10-CM

## 2019-12-06 PROCEDURE — 99203 OFFICE O/P NEW LOW 30 MIN: CPT | Performed by: OBSTETRICS & GYNECOLOGY

## 2019-12-06 ASSESSMENT — ENCOUNTER SYMPTOMS
BLOOD IN STOOL: 0
VOMITING: 0
RECTAL PAIN: 0
ANAL BLEEDING: 0
DIARRHEA: 0
ABDOMINAL PAIN: 0
ALLERGIC/IMMUNOLOGIC NEGATIVE: 1
CONSTIPATION: 0
NAUSEA: 0
ABDOMINAL DISTENTION: 0
EYES NEGATIVE: 1
RESPIRATORY NEGATIVE: 1

## 2020-06-03 ENCOUNTER — TELEMEDICINE (OUTPATIENT)
Dept: FAMILY MEDICINE CLINIC | Age: 50
End: 2020-06-03

## 2020-06-03 VITALS — TEMPERATURE: 97.7 F | HEIGHT: 67 IN | WEIGHT: 150 LBS | BODY MASS INDEX: 23.54 KG/M2

## 2020-06-03 PROCEDURE — 99442 PR PHYS/QHP TELEPHONE EVALUATION 11-20 MIN: CPT | Performed by: NURSE PRACTITIONER

## 2020-06-03 RX ORDER — AMOXICILLIN AND CLAVULANATE POTASSIUM 875; 125 MG/1; MG/1
1 TABLET, FILM COATED ORAL 2 TIMES DAILY
Qty: 14 TABLET | Refills: 0 | Status: SHIPPED | OUTPATIENT
Start: 2020-06-03 | End: 2020-06-10

## 2020-06-03 ASSESSMENT — ENCOUNTER SYMPTOMS
RHINORRHEA: 1
SINUS PAIN: 1
SORE THROAT: 0
PHOTOPHOBIA: 0
BACK PAIN: 0
COUGH: 1
CHEST TIGHTNESS: 0
WHEEZING: 1
COLOR CHANGE: 0
SHORTNESS OF BREATH: 1

## 2020-06-03 NOTE — PROGRESS NOTES
file     Physically abused: Not on file     Forced sexual activity: Not on file   Other Topics Concern    Not on file   Social History Narrative    Not on file     No family history on file. Allergies:  Demerol; Meperidine; and Sulfa antibiotics    Review of Systems   Constitutional: Negative for appetite change, chills, diaphoresis, fatigue and fever. HENT: Positive for congestion, rhinorrhea and sinus pain. Negative for sore throat. Eyes: Negative for photophobia and visual disturbance. Respiratory: Positive for cough (productive yellow), shortness of breath (a little bit when talking) and wheezing. Negative for chest tightness. Cardiovascular: Negative for chest pain, palpitations and leg swelling. Endocrine: Negative for polydipsia, polyphagia and polyuria. Genitourinary: Negative for difficulty urinating and dysuria. Musculoskeletal: Negative for arthralgias and back pain. Skin: Negative for color change and rash. Neurological: Positive for headaches. Negative for dizziness. Psychiatric/Behavioral: Negative for dysphoric mood. The patient is not nervous/anxious. PHYSICAL EXAM/ RESULTS    Temp 97.7 °F (36.5 °C)   Ht 5' 7\" (1.702 m)   Wt 150 lb (68 kg)   LMP 05/07/2020 (Approximate)   BMI 23.49 kg/m²     Vitals signs: pt does not have the proper equipment to take all VS.    The physical is not performed due to the visit being a telephone counter as indicated due to the restrictions of the COVID-19 pandemic    No results found for this or any previous visit (from the past 2016 hour(s)). Assessment:       Diagnosis Orders   1. Acute upper respiratory infection  amoxicillin-clavulanate (AUGMENTIN) 875-125 MG per tablet     Pt advised to rest and drink plenty of fluids. Finish all antibiotics even if feeling better. OTC analgesics for symptom management. Salt water gargle for sore throat. RTO if symptoms worsen or if no improvement in 72 hours.     Off work for the rest of the week. F/u if no improvement. Will get mammogram done as previously ordered. No orders of the defined types were placed in this encounter. Plan:   Return if symptoms worsen or fail to improve. There are no Patient Instructions on file for this visit. This visit ended at 929.     LUZ Gallagher, RO-C.

## 2020-06-19 ENCOUNTER — TELEPHONE (OUTPATIENT)
Dept: FAMILY MEDICINE CLINIC | Age: 50
End: 2020-06-19

## 2020-06-19 RX ORDER — FLUCONAZOLE 150 MG/1
150 TABLET ORAL ONCE
Qty: 1 TABLET | Refills: 1 | Status: SHIPPED | OUTPATIENT
Start: 2020-06-19 | End: 2020-06-19

## 2020-07-31 ENCOUNTER — OFFICE VISIT (OUTPATIENT)
Dept: FAMILY MEDICINE CLINIC | Age: 50
End: 2020-07-31

## 2020-07-31 VITALS
WEIGHT: 162 LBS | DIASTOLIC BLOOD PRESSURE: 76 MMHG | TEMPERATURE: 98 F | HEART RATE: 75 BPM | HEIGHT: 67 IN | OXYGEN SATURATION: 98 % | SYSTOLIC BLOOD PRESSURE: 130 MMHG | BODY MASS INDEX: 25.43 KG/M2

## 2020-07-31 PROCEDURE — 99386 PREV VISIT NEW AGE 40-64: CPT | Performed by: NURSE PRACTITIONER

## 2020-07-31 ASSESSMENT — ENCOUNTER SYMPTOMS
COUGH: 0
CONSTIPATION: 0
CHEST TIGHTNESS: 0
ABDOMINAL PAIN: 0
BACK PAIN: 0
DIARRHEA: 0
EYE PAIN: 0
TROUBLE SWALLOWING: 0
SHORTNESS OF BREATH: 0

## 2020-07-31 NOTE — PROGRESS NOTES
Subjective  Chief Complaint   Patient presents with    Gynecologic Exam     states that she spots all the time and may have an infection.  Hand Injury     states that she injured her hand at home getting out of the shower. she slipped and fell 7/26/20. thinks that she heperextended it.  Health Maintenance     has order for mammo       HPI    Patient is perimenopausal- No LMP recorded. She complains of irregular menses, vaginal discharge- yellow tinged, vulvar/vaginal itching and vulvar/vaginal irritation/pain. Menopausal/perimenopausal symptoms: none. Hormone therapy side effects: not applicable. Preventive Care and Risk Factor Assessment  Health Maintenance   Topic Date Due    BREAST CANCER SCREENING  04/23/2014    TETANUS VACCINE ADULT (11 YEARS AND UP)  05/23/2014    FLU VACCINE YEARLY (ADULT)  09/01/2014    DEXA SCAN WOMEN  04/23/2015    CERVICAL CANCER SCREENING  04/15/2016    COLON CANCER SCREENING COLONOSCOPY  01/09/2018    ZOSTAVAX VACCINE  Completed        Hx abnormal PAP: yes - ascus  Sexual activity: has sex with males. Hx of STD: no  Hx of abnormal mammogram: no  Self-breast exams: yes  FH of breast cancer: no  FH of GYN cancer: no   Previous DEXA scan: no, not postmenopausal  Exercise: very active regularly    [place] coming out of the shower and hyperextended her hand. Everything feels okay now except her thumb. Has full ROM but does still have some pain and swelling. No insurance so does not want to pursue any further work up.       Past Medical History:   Diagnosis Date    Bipolar 1 disorder ([address] 6/29/2016    Hemangioma 2016    ct abdomen, left lobe    Liver lesion, right lobe 2007, 2016    stable between 2 readings    Thoracic back pain      Patient Active Problem List    Diagnosis Date Noted    Thoracic back pain     Bipolar 1 disorder ([address] 06/29/2016    Liver lesion, right lobe     Hemangioma 01/01/2016    Subscapularis tendinitis 03/21/2013    Chest pain 03/21/2013 Past Surgical History:   Procedure Laterality Date    APPENDECTOMY  6/10/16    laparoscopic Dr Alycia Metcalf  06/27/2016    w/bx     CYST REMOVAL      DENTAL SURGERY      NECK SURGERY      TUBAL LIGATION       No family history on file. Social History     Socioeconomic History    Marital status:      Spouse name: None    Number of children: None    Years of education: None    Highest education level: None   Occupational History    None   Social Needs    Financial resource strain: None    Food insecurity     Worry: None     Inability: None    Transportation needs     Medical: None     Non-medical: None   Tobacco Use    Smoking status: Current Every Day Smoker     Packs/day: 1.00     Types: Cigarettes    Smokeless tobacco: Never Used   Substance and Sexual Activity    Alcohol use: Yes     Comment: occasionally    Drug use: Yes     Types: Marijuana    Sexual activity: Not Currently     Partners: Male   Lifestyle    Physical activity     Days per week: None     Minutes per session: None    Stress: None   Relationships    Social connections     Talks on phone: None     Gets together: None     Attends [employer] service: None     Active member of club or organization: None     Attends meetings of clubs or organizations: None     Relationship status: None    Intimate partner violence     Fear of current or ex partner: None     Emotionally abused: None     Physically abused: None     Forced sexual activity: None   Other Topics Concern    None   Social History Narrative    None     Current Outpatient Medications on File Prior to Visit   Medication Sig Dispense Refill    DULoxetine (CYMBALTA) 60 MG extended release capsule Take 60 mg by mouth daily      DULoxetine (CYMBALTA) 30 MG extended release capsule Take 30 mg by mouth daily       No current facility-administered medications on file prior to visit.       Allergies   Allergen Reactions    Demerol     Meperidine Itching    Sulfa Antibiotics        Review of Systems   Constitutional: Negative for activity change, appetite change, chills, diaphoresis and fatigue. HENT: Negative for ear pain, hearing loss and trouble swallowing. Eyes: Negative for pain and visual disturbance. Respiratory: Negative for cough, chest tightness and shortness of breath. Cardiovascular: Negative for chest pain, palpitations and leg swelling. Gastrointestinal: Negative for abdominal pain, constipation and diarrhea. Genitourinary: Positive for vaginal discharge and vaginal pain. Negative for dyspareunia, dysuria, enuresis, flank pain, frequency, genital sores, hematuria, menstrual problem and pelvic pain. Musculoskeletal: Positive for arthralgias (right thumb). Negative for back pain. Neurological: Negative for dizziness and light-headedness. Psychiatric/Behavioral: Negative for dysphoric mood. The patient is not nervous/anxious. Objective  Vitals:    07/31/20 1135   BP: 130/76   Site: Right Upper Arm   Position: Sitting   Cuff Size: Medium Adult   Pulse: 75   Temp: 98 °F (36.7 °C)   TempSrc: Infrared   SpO2: 98%   Weight: 162 lb (73.5 kg)   Height: 5' 7\" (1.702 m)     Physical Exam  Constitutional:       General: She is not in acute distress. Appearance: Normal appearance. She is normal weight. She is not ill-appearing, toxic-appearing or diaphoretic. HENT:      Head: Normocephalic and atraumatic. Right Ear: External ear normal.      Left Ear: External ear normal.   Neck:      Musculoskeletal: Normal range of motion and neck supple. No muscular tenderness. Cardiovascular:      Rate and Rhythm: Normal rate and regular rhythm. Pulses: Normal pulses. Heart sounds: Normal heart sounds. No murmur. Pulmonary:      Effort: Pulmonary effort is normal. No respiratory distress. Breath sounds: Normal breath sounds. No stridor. No wheezing, rhonchi or rales. Chest:      Chest wall: No tenderness. Musculoskeletal: Normal range of motion. Right lower leg: No edema. Left lower leg: No edema. Lymphadenopathy:      Cervical: No cervical adenopathy. Skin:     General: Skin is warm and dry. Capillary Refill: Capillary refill takes less than 2 seconds. Coloration: Skin is not jaundiced or pale. Findings: No bruising, erythema, lesion or rash. Neurological:      General: No focal deficit present. Mental Status: She is alert and oriented to person, place, and time. Mental status is at baseline. Psychiatric:         Mood and Affect: Mood normal.         Behavior: Behavior normal.         Thought Content: Thought content normal.         Judgment: Judgment normal.         Assessment& Plan     Diagnosis Orders   1. Encounter for Papanicolaou smear for cervical cancer screening  PAP SMEAR   2. Vaginal discharge  Wet Prep, Genital   3. Pain of right thumb       Testing as ordered. Will f/u with testing. Will hold off on xray d/t no insurance, conservative treatment with nsaids and rest.  F/u if no improvement. Andrey order reprinted. Side effects, adverse effects of the medication prescribed today, as well as treatment plan/ rationale and result expectations have been discussed with the patient who expresses understanding and desires to proceed. Close follow up to evaluate treatment results and for coordination of care. I have reviewed the patient's medical history in detail and updated the computerized patient record. As always, patient is advised that if symptoms worsen in any way they will proceed to the nearest emergency room. Orders Placed This Encounter   Procedures    Wet Prep, Genital     Standing Status:   Future     Standing Expiration Date:   7/31/2021    PAP SMEAR     Patient History:    Patient's last menstrual period was 07/14/2020.   OBGYN Status: Having periods  Past Surgical History:  6/10/16: APPENDECTOMY      Comment:  laparoscopic  Izzy  06/27/2016: COLONOSCOPY      Comment:  w/bx   No date: CYST REMOVAL  No date: DENTAL SURGERY  No date: NECK SURGERY  No date: TUBAL LIGATION      Social History    Tobacco Use      Smoking status: Current Every Day Smoker        Packs/day: 1.00        Types: Cigarettes      Smokeless tobacco: Never Used       Standing Status:   Future     Standing Expiration Date:   7/31/2021     Order Specific Question:   Collection Type     Answer:   Imaged Thin Prep     Order Specific Question:   Prior Abnormal Pap Test     Answer:   No     Order Specific Question:   Screening or Diagnostic     Answer:   Screening     Order Specific Question:   Additional STD Testing     Answer:   GC and Chlamydia     Order Specific Question:   Diagnosis Code for Additional STD Testing     Answer:   Screen for STD (sexually transmitted disease) (Z11.3)     Order Specific Question:   HPV Requested? Answer:   HPV Co-Test     Order Specific Question:   High Risk Patient     Answer:   N/A       No orders of the defined types were placed in this encounter. Return if symptoms worsen or fail to improve.     Willa Caraballo, APRN - CNP

## 2020-08-01 DIAGNOSIS — Z12.4 ENCOUNTER FOR PAPANICOLAOU SMEAR FOR CERVICAL CANCER SCREENING: ICD-10-CM

## 2020-08-01 DIAGNOSIS — N89.8 VAGINAL DISCHARGE: ICD-10-CM

## 2020-08-01 LAB
CLUE CELLS: ABNORMAL
TRICHOMONAS PREP: ABNORMAL
YEAST WET PREP: ABNORMAL

## 2020-08-04 RX ORDER — METRONIDAZOLE 500 MG/1
500 TABLET ORAL 2 TIMES DAILY
Qty: 14 TABLET | Refills: 0 | Status: SHIPPED | OUTPATIENT
Start: 2020-08-04 | End: 2020-08-11

## 2020-08-07 LAB
C. TRACHOMATIS DNA,THIN PREP: NEGATIVE
N. GONORRHOEAE DNA, THIN PREP: NEGATIVE

## 2020-11-03 ENCOUNTER — VIRTUAL VISIT (OUTPATIENT)
Dept: FAMILY MEDICINE CLINIC | Age: 50
End: 2020-11-03

## 2020-11-03 ENCOUNTER — NURSE ONLY (OUTPATIENT)
Dept: FAMILY MEDICINE CLINIC | Age: 50
End: 2020-11-03

## 2020-11-03 ENCOUNTER — NURSE TRIAGE (OUTPATIENT)
Dept: OTHER | Facility: CLINIC | Age: 50
End: 2020-11-03

## 2020-11-03 DIAGNOSIS — J40 BRONCHITIS: ICD-10-CM

## 2020-11-03 PROCEDURE — 99000 SPECIMEN HANDLING OFFICE-LAB: CPT | Performed by: NURSE PRACTITIONER

## 2020-11-03 PROCEDURE — 99442 PR PHYS/QHP TELEPHONE EVALUATION 11-20 MIN: CPT | Performed by: NURSE PRACTITIONER

## 2020-11-03 RX ORDER — METHYLPREDNISOLONE 4 MG/1
TABLET ORAL
Qty: 21 TABLET | Refills: 0 | Status: SHIPPED | OUTPATIENT
Start: 2020-11-03 | End: 2020-11-09

## 2020-11-03 RX ORDER — AZITHROMYCIN 250 MG/1
250 TABLET, FILM COATED ORAL SEE ADMIN INSTRUCTIONS
Qty: 6 TABLET | Refills: 0 | Status: SHIPPED | OUTPATIENT
Start: 2020-11-03 | End: 2020-11-08

## 2020-11-03 RX ORDER — ARIPIPRAZOLE 2 MG/1
2 TABLET ORAL DAILY
COMMUNITY
End: 2021-10-08

## 2020-11-03 ASSESSMENT — ENCOUNTER SYMPTOMS
CHEST TIGHTNESS: 1
WHEEZING: 1
COUGH: 0
SHORTNESS OF BREATH: 1
SORE THROAT: 1
SINUS PAIN: 1

## 2020-11-03 NOTE — PROGRESS NOTES
11/3/2020    TELEHEALTH EVALUATION -- Audio/Visual (During LCTOB-75 public health emergency)    Due to COVID 19 outbreak, patient's office visit was converted to a virtual visit. Patient was contacted and agreed to proceed with a virtual visit via Telephone Visit  The risks and benefits of converting to a virtual visit were discussed in light of the current infectious disease epidemic. Patient also understood that insurance coverage and co-pays are up to their individual insurance plans. This visit started at 110    HPI:    Randle Stai (:  1970) has requested an audio/video evaluation for the following concern(s):    Symptoms began a couple of weeks ago with sore throat and body aches. Now feeling sob with wheezing. Coughing until throwing up. \"glands\" are swollen. Tongue feels \"big\"   Excessively fatigued. Hard to catch breath. Hot and cold chills. Review of Systems   Constitutional: Positive for fatigue. HENT: Positive for congestion, sinus pain and sore throat. Respiratory: Positive for chest tightness, shortness of breath and wheezing. Negative for cough. Cardiovascular: Negative for chest pain. Prior to Visit Medications    Medication Sig Taking? Authorizing Provider   ARIPiprazole (ABILIFY) 2 MG tablet Take 2 mg by mouth daily Yes Historical Provider, MD   azithromycin (ZITHROMAX) 250 MG tablet Take 1 tablet by mouth See Admin Instructions for 5 days 500mg on day 1 followed by 250mg on days 2 - 5 Yes LUZ Borges CNP   methylPREDNISolone (MEDROL DOSEPACK) 4 MG tablet Take by mouth.  Yes LUZ Borges CNP   DULoxetine (CYMBALTA) 60 MG extended release capsule Take 60 mg by mouth daily Yes Historical Provider, MD   DULoxetine (CYMBALTA) 30 MG extended release capsule Take 30 mg by mouth daily Yes Historical Provider, MD       Social History     Tobacco Use    Smoking status: Current Every Day Smoker     Packs/day: 1.00     Types: Cigarettes    Smokeless tobacco: Never Used   Substance Use Topics    Alcohol use: Yes     Comment: occasionally    Drug use: Yes     Types: Marijuana        Allergies   Allergen Reactions    Demerol     Meperidine Itching    Sulfa Antibiotics    ,   Past Medical History:   Diagnosis Date    Bipolar 1 disorder ([facility] 6/29/2016    Hemangioma 2016    ct abdomen, left lobe    Liver lesion, right lobe 2007, 2016    stable between 2 readings    Thoracic back pain    ,   Past Surgical History:   Procedure Laterality Date    APPENDECTOMY  6/10/16    laparoscopic Dr Milner Scale  06/27/2016    w/bx     CYST REMOVAL      DENTAL SURGERY      NECK SURGERY      TUBAL LIGATION     ,   Social History     Tobacco Use    Smoking status: Current Every Day Smoker     Packs/day: 1.00     Types: Cigarettes    Smokeless tobacco: Never Used   Substance Use Topics    Alcohol use: Yes     Comment: occasionally    Drug use: Yes     Types: Marijuana   , No family history on file. PHYSICAL EXAMINATION:  [ INSTRUCTIONS:  \"[x]\" Indicates a positive item  \"[]\" Indicates a negative item  -- DELETE ALL ITEMS NOT EXAMINED]  [x] Alert  [x] Oriented to person/place/time    [] No apparent distress  [] Toxic appearing    [] Face flushed appearing [] Sclera clear  [] Lips are cyanotic      [] Breathing appears normal  [] Appears tachypneic      [] Rash on visible skin    [] Cranial Nerves II-XII grossly intact    [] Motor grossly intact in visible upper extremities    [] Motor grossly intact in visible lower extremities    [x] Normal Mood  [] Anxious appearing    [] Depressed appearing  [] Confused appearing      [] Poor short term memory  [] Poor long term memory    [] OTHER:      Due to this being a TeleHealth encounter, evaluation of the following organ systems is limited: Vitals/Constitutional/EENT/Resp/CV/GI//MS/Neuro/Skin/Heme-Lymph-Imm. ASSESSMENT/PLAN:  1. Bronchitis    - COVID-19 Ambulatory;  Future  - azithromycin (ZITHROMAX) 250 MG tablet; Take 1 tablet by mouth See Admin Instructions for 5 days 500mg on day 1 followed by 250mg on days 2 - 5  Dispense: 6 tablet; Refill: 0  - methylPREDNISolone (MEDROL DOSEPACK) 4 MG tablet; Take by mouth. Dispense: 21 tablet; Refill: 0      Side effects, adverse effects of the medication prescribed today, as well as treatment plan/ rationale and result expectations have been discussed with the patient who expresses understanding and desires to proceed. Close follow up to evaluate treatment results and for coordination of care. I have reviewed the patient's medical history in detail and updated the computerized patient record. As always, patient is advised that if symptoms worsen in any way they will proceed to the nearest emergency room. This visit ended at 121    An  electronic signature was used to authenticate this note. --LUZ Godfrey - CNP on 11/3/2020 at 1:18 PM        Pursuant to the emergency declaration under the [facility] waiver authority and the [employer] Act, this Virtual  Visit was conducted, with patient's consent, to reduce the patient's risk of exposure to COVID-19 and provide continuity of care for an established patient. Services were provided through a video synchronous discussion virtually to substitute for in-person clinic visit.

## 2020-11-03 NOTE — LETTER
[address]  Phone: [phone]  Fax: [phone]    LUZ Emanuel CNP        November 3, 2020     Patient: Kane Delgado   YOB: 1970   Date of Visit: 11/3/2020       To Whom It May Concern: It is my medical opinion that Neftali Orta was tested for covid today and will be cleared for work upon return of testing and further evaluation. Earliest return would be 11/9/2020. If you have any questions or concerns, please don't hesitate to call.     Sincerely,        LUZ Emanuel CNP

## 2020-11-03 NOTE — TELEPHONE ENCOUNTER
Reason for Disposition   MILD difficulty breathing (e.g., minimal/no SOB at rest, SOB with walking, pulse < 100) of new onset or worse than normal    Answer Assessment - Initial Assessment Questions  1. RESPIRATORY STATUS: \"Describe your breathing? \" (e.g., wheezing, shortness of breath, unable to speak, severe coughing)       SOB    2. ONSET: \"When did this breathing problem begin? \"       Approx 1 week. 3. PATTERN \"Does the difficult breathing come and go, or has it been constant since it started? \"      Intermittent - worse after talking     4. SEVERITY: \"How bad is your breathing? \" (e.g., mild, moderate, severe)     - MILD: No SOB at rest, mild SOB with walking, speaks normally in sentences, can lay down, no retractions, pulse < 100.     - MODERATE: SOB at rest, SOB with minimal exertion and prefers to sit, cannot lie down flat, speaks in phrases, mild retractions, audible wheezing, pulse 100-120.     - SEVERE: Very SOB at rest, speaks in single words, struggling to breathe, sitting hunched forward, retractions, pulse > 120       Mild    5. RECURRENT SYMPTOM: \"Have you had difficulty breathing before? \" If so, ask: \"When was the last time? \" and \"What happened that time? \"       Never had before. 6. CARDIAC HISTORY: \"Do you have any history of heart disease? \" (e.g., heart attack, angina, bypass surgery, angioplasty)       Denies hx - had murmur as a child    7. LUNG HISTORY: \"Do you have any history of lung disease? \"  (e.g., pulmonary embolus, asthma, emphysema)      Denies    8. CAUSE: \"What do you think is causing the breathing problem? \"       Unsure    9. OTHER SYMPTOMS: \"Do you have any other symptoms? (e.g., dizziness, runny nose, cough, chest pain, fever)     Dizziness, SOB, dysphagia, vomiting after severe coughing fits    10. PREGNANCY: \"Is there any chance you are pregnant? \" \"When was your last menstrual period? \"        N/A    11. TRAVEL: \"Have you traveled out of the country in the last month? \"

## 2020-11-04 LAB
SARS-COV-2: NOT DETECTED
SOURCE: NORMAL

## 2020-12-07 ENCOUNTER — NURSE ONLY (OUTPATIENT)
Dept: FAMILY MEDICINE CLINIC | Age: 50
End: 2020-12-07

## 2020-12-07 ENCOUNTER — VIRTUAL VISIT (OUTPATIENT)
Dept: FAMILY MEDICINE CLINIC | Age: 50
End: 2020-12-07

## 2020-12-07 DIAGNOSIS — Z20.822 ENCOUNTER BY TELEHEALTH FOR SUSPECTED COVID-19: ICD-10-CM

## 2020-12-07 PROCEDURE — 99442 PR PHYS/QHP TELEPHONE EVALUATION 11-20 MIN: CPT | Performed by: NURSE PRACTITIONER

## 2020-12-07 RX ORDER — METHYLPREDNISOLONE 4 MG/1
TABLET ORAL
Qty: 1 KIT | Refills: 0 | Status: SHIPPED | OUTPATIENT
Start: 2020-12-07 | End: 2020-12-13

## 2020-12-07 RX ORDER — AZITHROMYCIN 250 MG/1
250 TABLET, FILM COATED ORAL DAILY
Qty: 6 TABLET | Refills: 0 | Status: SHIPPED | OUTPATIENT
Start: 2020-12-07 | End: 2020-12-12

## 2020-12-07 ASSESSMENT — ENCOUNTER SYMPTOMS
DIARRHEA: 0
SHORTNESS OF BREATH: 1
SINUS PRESSURE: 1
NAUSEA: 0
COUGH: 1
RHINORRHEA: 1
ABDOMINAL PAIN: 0
CHEST TIGHTNESS: 1
WHEEZING: 1
VOMITING: 0

## 2020-12-07 NOTE — PROGRESS NOTES
This visit began at 12:36    The location for this appointment was the [facility] primary care site. TELEHEALTH APPOINTMENT  Patient has been screened to determine that this visit qualifies for a \"Video Visit\". This visit was via video due to the restrictions of the COVID-19 pandemic. All issues as below were discussed and addressed but note a limited visually based physical exam was performed. If it was felt the patient should be evaluated in the clinic there will be comment below demonstrating they were directed there. The patient is aware and has given verbal consent to be billed for this video encounter. The resources used for this visit were Travel Notes for chart access and telephone    Chief Complaint   Patient presents with    Concern For COVID-19     pt states she may have been exposed to COVID, pt states sob, fatigue, chest congestion, headache, loss of taste x 1 week. pt states she was exposed 10 days ago. pt states she has only taken ibuprofen & multivitamins. HPI  Symptoms started 12/2  Patient is sob, fatigue, chills, chest congestion, headache and loss of taste. Exposed to coworker 10 days ago. Has been taking ibuprofen and multivitamins. PMH:    Current Outpatient Medications on File Prior to Visit   Medication Sig Dispense Refill    ARIPiprazole (ABILIFY) 2 MG tablet Take 2 mg by mouth daily      DULoxetine (CYMBALTA) 60 MG extended release capsule Take 60 mg by mouth daily      DULoxetine (CYMBALTA) 30 MG extended release capsule Take 30 mg by mouth daily       No current facility-administered medications on file prior to visit.       Past Medical History:   Diagnosis Date    Bipolar 1 disorder ([facility] [address] 6/29/2016    Hemangioma 2016    ct abdomen, left lobe    Liver lesion, right lobe 2007, 2016    stable between 2 readings    Thoracic back pain      Past Surgical History:   Procedure Laterality Date    APPENDECTOMY  6/10/16    laparoscopic Dr Xavi Nelson COLONOSCOPY  06/27/2016    w/bx     CYST REMOVAL      DENTAL SURGERY      NECK SURGERY      TUBAL LIGATION       Social History     Socioeconomic History    Marital status:      Spouse name: Not on file    Number of children: Not on file    Years of education: Not on file    Highest education level: Not on file   Occupational History    Not on file   Social Needs    Financial resource strain: Not on file    Food insecurity     Worry: Not on file     Inability: Not on file    Transportation needs     Medical: Not on file     Non-medical: Not on file   Tobacco Use    Smoking status: Current Every Day Smoker     Packs/day: 1.00     Types: Cigarettes    Smokeless tobacco: Never Used   Substance and Sexual Activity    Alcohol use: Yes     Comment: occasionally    Drug use: Yes     Types: Marijuana    Sexual activity: Not Currently     Partners: Male   Lifestyle    Physical activity     Days per week: Not on file     Minutes per session: Not on file    Stress: Not on file   Relationships    Social connections     Talks on phone: Not on file     Gets together: Not on file     Attends Yarsani service: Not on file     Active member of club or organization: Not on file     Attends meetings of clubs or organizations: Not on file     Relationship status: Not on file    Intimate partner violence     Fear of current or ex partner: Not on file     Emotionally abused: Not on file     Physically abused: Not on file     Forced sexual activity: Not on file   Other Topics Concern    Not on file   Social History Narrative    Not on file     No family history on file. Allergies:  Demerol; Meperidine; and Sulfa antibiotics    Review of Systems   Constitutional: Positive for appetite change (loss of appetite), chills and fatigue. Negative for diaphoresis. HENT: Positive for congestion (nasal and chest), rhinorrhea and sinus pressure.          Loss of taste   Respiratory: Positive for cough (baseline smokers cough), chest tightness, shortness of breath and wheezing. Gastrointestinal: Negative for abdominal pain, diarrhea, nausea and vomiting. Musculoskeletal: Positive for myalgias. Neurological: Positive for headaches. PHYSICAL EXAM/ RESULTS    (Limited exam: only performed what is available through a visual exam during the video encounter as indicated due to the restrictions of the COVID-19 pandemic)    There were no vitals taken for this visit. Physical Exam  HENT:      Nose: Congestion and rhinorrhea present. Eyes:      General:         Right eye: No discharge. Left eye: No discharge. Pulmonary:      Effort: Pulmonary effort is normal. No respiratory distress. Psychiatric:         Thought Content: Thought content normal.         Judgment: Judgment normal.         Recent Results (from the past 2016 hour(s))   COVID-19 Ambulatory    Collection Time: 11/03/20  6:22 PM    Specimen: Nasopharyngeal Swab   Result Value Ref Range    SARS-CoV-2 Not Detected Not Detected    Source Anterior nares            Assessment:       Diagnosis Orders   1. Encounter by telehealth for suspected COVID-19  COVID-19 Ambulatory    methylPREDNISolone (MEDROL DOSEPACK) 4 MG tablet    azithromycin (ZITHROMAX) 250 MG tablet         Orders Placed This Encounter   Procedures    COVID-19 Ambulatory     Standing Status:   Future     Standing Expiration Date:   12/7/2021     Scheduling Instructions:      Saline media preferred given current shortage of viral transport media but both acceptable     Order Specific Question:   Is this test for diagnosis or screening? Answer:   Diagnosis of ill patient     Order Specific Question:   Symptomatic for COVID-19 as defined by CDC? Answer:   Yes     Order Specific Question:   Date of Symptom Onset     Answer:   12/2/2020     Order Specific Question:   Hospitalized for COVID-19?      Answer:   No     Order Specific Question:   Admitted to ICU for COVID-19? Answer:   No     Order Specific Question:   Employed in healthcare setting? Answer:   No     Order Specific Question:   Resident in a congregate (group) care setting? Answer:   No     Order Specific Question:   Pregnant? Answer:   No     Order Specific Question:   Previously tested for COVID-19? Answer:   Yes         Plan:   Return if symptoms worsen or fail to improve, for follow up with your PCP. Patient Instructions       Patient Education        10 Things to Do When You Have COVID-19    Stay home. Don't go to school, work, or public areas. And don't use public transportation, ride-shares, or taxis unless you have no choice. Leave your home only if you need to get medical care. But call the doctor's office first so they know you're coming. And wear a cloth face cover. Ask before leaving isolation. Talk with your doctor or other health professional about when it will be safe for you to leave isolation. Wear a cloth face cover when you are around other people. It can help stop the spread of the virus when you cough or sneeze. Limit contact with people in your home. If possible, stay in a separate bedroom and use a separate bathroom. Avoid contact with pets and other animals. If possible, have a friend or family member care for them while you're sick. Cover your mouth and nose with a tissue when you cough or sneeze. Then throw the tissue in the trash right away. Wash your hands often, especially after you cough or sneeze. Use soap and water, and scrub for at least 20 seconds. If soap and water aren't available, use an alcohol-based hand . Don't share personal household items. These include bedding, towels, cups and glasses, and eating utensils. Clean and disinfect your home every day. Use household  or disinfectant wipes or sprays. Take special care to clean things that you grab with your hands.  These include doorknobs, remote controls, phones, and handles on your refrigerator and microwave. And don't forget countertops, tabletops, bathrooms, and computer keyboards. Take acetaminophen (Tylenol) to relieve fever and body aches. Read and follow all instructions on the label. Current as of: July 10, 2020               Content Version: 12.6  © 2006-2020 [employer]. Care instructions adapted under license by [facility] ([facility]). If you have questions about a medical condition or this instruction, always ask your healthcare professional. [place] [address] any warranty or liability for your use of this information. Side effects and adverse effects of any medication prescribed today, as well as treatment plan/rationale, follow-up care, and result expectations have been discussed with the patient. Expresses understanding and desires to proceed with treatment plan. Discussed signs and symptoms which require immediate follow-up in ED/call to 911. Understanding verbalized. I have reviewed and updated the electronic medical record. As always, if symptoms worsen, go directly to ED. This visit ended at 12:47    The resources used for this visit were [employer] for chart access and telephone    LUZ Butler CNP      An  electronic signature was used to authenticate this note. --LUZ Chino CNP on 12/7/2020 at 12:47 PM        Pursuant to the emergency declaration under the [facility], 1135 waiver authority and the [employer] General Act, this Virtual  Visit was conducted, with patient's consent, to reduce the patient's risk of exposure to COVID-19 and provide continuity of care for an established patient. Services were provided through a video synchronous discussion virtually to substitute for in-person clinic visit.

## 2020-12-07 NOTE — LETTER
[address], [facility] [address]  Phone: [phone]  Fax: [phone]    LUZ Francisco CNP        December 7, 2020     Patient: Edith Cook   YOB: 1970   Date of Visit: 12/7/2020       To Whom it May Concern:    Katt Luo was seen in my clinic on 12/7/2020. She was tested for covid. If you have any questions or concerns, please don't hesitate to call.     Sincerely,         Anjelica Mir, APRN - CNP

## 2020-12-07 NOTE — PATIENT INSTRUCTIONS
Patient Education        10 Things to Do When You Have COVID-19    Stay home. Don't go to school, work, or public areas. And don't use public transportation, ride-shares, or taxis unless you have no choice. Leave your home only if you need to get medical care. But call the doctor's office first so they know you're coming. And wear a cloth face cover. Ask before leaving isolation. Talk with your doctor or other health professional about when it will be safe for you to leave isolation. Wear a cloth face cover when you are around other people. It can help stop the spread of the virus when you cough or sneeze. Limit contact with people in your home. If possible, stay in a separate bedroom and use a separate bathroom. Avoid contact with pets and other animals. If possible, have a friend or family member care for them while you're sick. Cover your mouth and nose with a tissue when you cough or sneeze. Then throw the tissue in the trash right away. Wash your hands often, especially after you cough or sneeze. Use soap and water, and scrub for at least 20 seconds. If soap and water aren't available, use an alcohol-based hand . Don't share personal household items. These include bedding, towels, cups and glasses, and eating utensils. Clean and disinfect your home every day. Use household  or disinfectant wipes or sprays. Take special care to clean things that you grab with your hands. These include doorknobs, remote controls, phones, and handles on your refrigerator and microwave. And don't forget countertops, tabletops, bathrooms, and computer keyboards. Take acetaminophen (Tylenol) to relieve fever and body aches. Read and follow all instructions on the label. Current as of: July 10, 2020               Content Version: 12.6  © 2006-2020 [employer]. Care instructions adapted under license by [facility] ([facility]).  If you have questions about a medical condition or this instruction, always ask your healthcare professional. [place] [address] any warranty or liability for your use of this information.

## 2020-12-08 LAB
SARS-COV-2: NOT DETECTED
SOURCE: NORMAL

## 2020-12-14 ENCOUNTER — TELEPHONE (OUTPATIENT)
Dept: FAMILY MEDICINE CLINIC | Age: 50
End: 2020-12-14

## 2021-02-05 ENCOUNTER — NURSE TRIAGE (OUTPATIENT)
Dept: OTHER | Facility: CLINIC | Age: 51
End: 2021-02-05

## 2021-02-05 ENCOUNTER — TELEPHONE (OUTPATIENT)
Dept: FAMILY MEDICINE CLINIC | Age: 51
End: 2021-02-05

## 2021-02-05 NOTE — TELEPHONE ENCOUNTER
[facility]  with red flag complaint. Brief description of triage: chills, fever, muscle pain, headache, weakness, dry cough, increased SOB, abdominal pain, diarrhea, vomiting, sore throat, loss o taste and smell and runny nose x 4 days. Pt requesting a COVID test so she can return to work. Triage indicates for patient to Go to [facility], pt made aware of recommendation, states she does not wish to go to ED as she is always SOB due to smoking, pt states SOB is worse than usual at this time. Placed patient on hold to call PCP office, Patient disconnected from call. Spoke with Brady Evans in the office who states office staff will reach out to patient to see if patient will come to office to be seen. Care advice provided, patient verbalizes understanding; denies any other questions or concerns; instructed to call back for any new or worsening symptoms. Attention Provider: Thank you for allowing me to participate in the care of your patient. The patient was connected to triage in response to information provided to the ECC. Please do not respond through this encounter as the response is not directed to a shared pool. Reason for Disposition   MILD difficulty breathing (e.g., minimal/no SOB at rest, SOB with walking, pulse <100)    Answer Assessment - Initial Assessment Questions  1. COVID-19 DIAGNOSIS: \"Who made your Coronavirus (COVID-19) diagnosis? \" \"Was it confirmed by a positive lab test?\" If not diagnosed by a HCP, ask \"Are there lots of cases (community spread) where you live? \" (See public health department website, if unsure)      Not diagnosed     2. COVID-19 EXPOSURE: \"Was there any known exposure to COVID before the symptoms began? \" CDC Definition of close contact: within 6 feet (2 meters) for a total of 15 minutes or more over a 24-hour period. Unsure     3. ONSET: \"When did the COVID-19 symptoms start? \"       4 days ago     4.  WORST SYMPTOM: \"What is your worst symptom? \" (e.g., cough, fever, shortness of breath, muscle aches)      Fatigue, headache and sore thoat    5. COUGH: \"Do you have a cough? \" If so, ask: \"How bad is the cough? \"        Dry cough    6. FEVER: \"Do you have a fever? \" If so, ask: \"What is your temperature, how was it measured, and when did it start? \"      No, pt states temperature 98, states she feels like that is a fever for her as she normal has temp of 97    7. RESPIRATORY STATUS: \"Describe your breathing? \" (e.g., shortness of breath, wheezing, unable to speak)      Pt states  \"normal but a little SOB\"    8. BETTER-SAME-WORSE: [place] Re[place] you getting better, staying the same or getting worse compared to yesterday? \"  If getting worse, ask, \"In what way? \"      Better     9. HIGH RISK DISEASE: \"Do you have any chronic medical problems? \" (e.g., asthma, heart or lung disease, weak immune system, obesity, etc.)      No    10. PREGNANCY: \"Is there any chance you are pregnant? \" \"When was your last menstrual period? \"        No, LMP 2 weeks ago    11. OTHER SYMPTOMS: \"Do you have any other symptoms? \"  (e.g., chills, fatigue, headache, loss of smell or taste, muscle pain, sore throat; new loss of smell or taste especially support the diagnosis of COVID-19)        Headache, fatigue, loss of smell and taste, sore throat, muscle pain, chills    Protocols used: CORONAVIRUS (COVID-19) DIAGNOSED OR SUSPECTED-ADULT-AH

## 2021-02-05 NOTE — TELEPHONE ENCOUNTER
[facility] Nurse Triage has noted that the pt go to the ER pt disconnected the call, is asking that we reach out to the pt. I called the pt and lmom to call the office to be seen.

## 2021-10-08 ENCOUNTER — NURSE ONLY (OUTPATIENT)
Dept: FAMILY MEDICINE CLINIC | Age: 51
End: 2021-10-08

## 2021-10-08 ENCOUNTER — VIRTUAL VISIT (OUTPATIENT)
Dept: FAMILY MEDICINE CLINIC | Age: 51
End: 2021-10-08
Payer: COMMERCIAL

## 2021-10-08 DIAGNOSIS — J06.9 VIRAL URI: Primary | ICD-10-CM

## 2021-10-08 DIAGNOSIS — Z20.822 ENCOUNTER FOR LABORATORY TESTING FOR COVID-19 VIRUS: Primary | ICD-10-CM

## 2021-10-08 LAB
Lab: NORMAL
PERFORMING INSTRUMENT: NORMAL
QC PASS/FAIL: NORMAL
SARS-COV-2, POC: NORMAL

## 2021-10-08 PROCEDURE — 99213 OFFICE O/P EST LOW 20 MIN: CPT | Performed by: NURSE PRACTITIONER

## 2021-10-08 PROCEDURE — 87426 SARSCOV CORONAVIRUS AG IA: CPT | Performed by: NURSE PRACTITIONER

## 2021-10-08 RX ORDER — HYDROXYZINE PAMOATE 50 MG/1
50 CAPSULE ORAL DAILY PRN
COMMUNITY
End: 2022-03-15

## 2021-10-08 SDOH — ECONOMIC STABILITY: FOOD INSECURITY: WITHIN THE PAST 12 MONTHS, THE FOOD YOU BOUGHT JUST DIDN'T LAST AND YOU DIDN'T HAVE MONEY TO GET MORE.: NEVER TRUE

## 2021-10-08 SDOH — ECONOMIC STABILITY: FOOD INSECURITY: WITHIN THE PAST 12 MONTHS, YOU WORRIED THAT YOUR FOOD WOULD RUN OUT BEFORE YOU GOT MONEY TO BUY MORE.: NEVER TRUE

## 2021-10-08 ASSESSMENT — ENCOUNTER SYMPTOMS
COUGH: 1
SHORTNESS OF BREATH: 0
NAUSEA: 1
SINUS PRESSURE: 0
CHEST TIGHTNESS: 0
SORE THROAT: 1
TROUBLE SWALLOWING: 0
BACK PAIN: 0
SINUS PAIN: 0
EYE PAIN: 0
DIARRHEA: 1
ABDOMINAL PAIN: 0
CONSTIPATION: 0
RHINORRHEA: 0
COLOR CHANGE: 0

## 2021-10-08 ASSESSMENT — SOCIAL DETERMINANTS OF HEALTH (SDOH): HOW HARD IS IT FOR YOU TO PAY FOR THE VERY BASICS LIKE FOOD, HOUSING, MEDICAL CARE, AND HEATING?: NOT HARD AT ALL

## 2021-10-08 NOTE — PROGRESS NOTES
10/8/2021    TELEHEALTH EVALUATION -- Audio/Visual (During [id number] public health emergency)    Due to COVID 19 outbreak, patient's office visit was converted to a virtual visit. Patient was contacted and agreed to proceed with a virtual visit via [employer]. me  The risks and benefits of converting to a virtual visit were discussed in light of the current infectious disease epidemic. Patient also understood that insurance coverage and co-pays are up to their individual insurance plans. HPI:    Alexey Oropeza (:  1970) has requested an audio/video evaluation for the following concern(s):    Chief Complaint   Patient presents with    Headache     states that she has had headache, pharyngitis, chills, body aches, diarrhea. states symptoms started Wednesday.  Health Maintenance     declining mammo & ct lung screening       HPI    Discuss concerns as above.    2 days-headache unrelieved by OTC medications, fatigue, sore throat, cough, congestion, body aches, chills and hot flashes, diarrhea, nausea and diarrhea. Positive covid exposure last Friday. Needs negative covid test to return to work. Review of Systems   Constitutional: Positive for chills, diaphoresis and fatigue. Negative for activity change, appetite change and fever. HENT: Positive for congestion and sore throat. Negative for ear pain, hearing loss, rhinorrhea, sinus pressure, sinus pain and trouble swallowing. Eyes: Negative for pain and visual disturbance. Respiratory: Positive for cough. Negative for chest tightness and shortness of breath. Cardiovascular: Negative for chest pain, palpitations and leg swelling. Gastrointestinal: Positive for diarrhea and nausea. Negative for abdominal pain and constipation. Endocrine: Negative for polydipsia, polyphagia and polyuria. Genitourinary: Negative for difficulty urinating and dysuria. Musculoskeletal: Positive for myalgias. Negative for arthralgias and back pain.    Skin: Negative for color change and rash. Neurological: Negative for dizziness and light-headedness. Psychiatric/Behavioral: Negative for dysphoric mood. The patient is not nervous/anxious. Prior to Visit Medications    Medication Sig Taking? Authorizing Provider   hydrOXYzine (VISTARIL) 50 MG capsule Take 50 mg by mouth daily as needed Yes Historical Provider, MD   DULoxetine (CYMBALTA) 60 MG extended release capsule Take 60 mg by mouth daily Yes Historical Provider, MD       Social History     Tobacco Use    Smoking status: Current Every Day Smoker     Packs/day: 1.00     Years: 34.00     Pack years: 34.00     Types: Cigarettes    Smokeless tobacco: Never Used   Substance Use Topics    Alcohol use: Yes     Comment: occasionally    Drug use: Yes     Types: Marijuana        Allergies   Allergen Reactions    Demerol     Meperidine Itching    Sulfa Antibiotics    ,   Past Medical History:   Diagnosis Date    Bipolar 1 disorder ([facility] [address] 6/29/2016    Hemangioma 2016    ct abdomen, left lobe    Liver lesion, right lobe 2007, 2016    stable between 2 readings    Thoracic back pain    ,   Past Surgical History:   Procedure Laterality Date    APPENDECTOMY  6/10/16    laparoscopic Dr Mykel Wolff  06/27/2016    w/bx     CYST REMOVAL      DENTAL SURGERY      NECK SURGERY      TUBAL LIGATION     ,   Social History     Tobacco Use    Smoking status: Current Every Day Smoker     Packs/day: 1.00     Years: 34.00     Pack years: 34.00     Types: Cigarettes    Smokeless tobacco: Never Used   Substance Use Topics    Alcohol use: Yes     Comment: occasionally    Drug use: Yes     Types: Marijuana   , History reviewed. No pertinent family history. ,   There is no immunization history on file for this patient.,   Health Maintenance   Topic Date Due    Hepatitis C screen  Never done    Pneumococcal 0-64 years Vaccine (1 of 2 - PPSV23) Never done    COVID-19 Vaccine (1) Never done    HIV screen  Never done    DTaP/Tdap/Td vaccine (1 - Tdap) Never done    Lipid screen  04/03/2018    Breast cancer screen  04/24/2020    Shingles Vaccine (1 of 2) Never done    Low dose CT lung screening  Never done    Flu vaccine (1) Never done    Cervical cancer screen  07/31/2025    Colon cancer screen colonoscopy  06/27/2026    Hepatitis A vaccine  Aged Out    Hepatitis B vaccine  Aged Out    Hib vaccine  Aged Out    Meningococcal (ACWY) vaccine  Aged Out       PHYSICAL EXAMINATION:  [ INSTRUCTIONS:  \"[x]\" Indicates a positive item  \"[]\" Indicates a negative item  -- DELETE ALL ITEMS NOT EXAMINED]  [x] Alert  [x] Oriented to person/place/time    [x] No apparent distress  [] Toxic appearing    [] Face flushed appearing [x] Sclera clear  [] Lips are cyanotic      [x] Breathing appears normal  [] Appears tachypneic      [x] Rash on visible skin    [x] Cranial Nerves II-XII grossly intact    [x] Motor grossly intact in visible upper extremities    [x] Motor grossly intact in visible lower extremities    [x] Normal Mood  [] Anxious appearing    [] Depressed appearing  [] Confused appearing      [] Poor short term memory  [] Poor long term memory    [] OTHER:      Due to this being a TeleHealth encounter, evaluation of the following organ systems is limited: Vitals/Constitutional/EENT/Resp/CV/GI//MS/Neuro/Skin/Heme-Lymph-Imm. ASSESSMENT/PLAN:  1. Viral URI  Covid test as ordered. Will call with results. Off tonight, may return tomorrow if negative covid. Treat symptomatically. - POCT COVID-19, Antigen    Side effects, adverse effects of the medication prescribed today, as well as treatment plan/ rationale and result expectations have been discussed with the patient who expresses understanding and desires to proceed. Close follow up to evaluate treatment results and for coordination of care. I have reviewed the patient's medical history in detail and updated the computerized patient record.     As always, patient is advised that if symptoms worsen in any way they will proceed to the nearest emergency room. Return if symptoms worsen or fail to improve. An  electronic signature was used to authenticate this note.     --LUZ Ames - CNP on 10/8/2021 at 9:35 AM

## 2021-11-18 ENCOUNTER — HOSPITAL ENCOUNTER (EMERGENCY)
Age: 51
Discharge: HOME | End: 2021-11-18
Attending: EMERGENCY MEDICINE
Payer: COMMERCIAL

## 2021-11-18 ENCOUNTER — APPOINTMENT (OUTPATIENT)
Dept: GENERAL RADIOLOGY | Age: 51
End: 2021-11-18
Payer: COMMERCIAL

## 2021-11-18 ENCOUNTER — NURSE TRIAGE (OUTPATIENT)
Dept: OTHER | Facility: CLINIC | Age: 51
End: 2021-11-18

## 2021-11-18 VITALS
WEIGHT: 150 LBS | BODY MASS INDEX: 23.54 KG/M2 | HEART RATE: 75 BPM | HEIGHT: 67 IN | TEMPERATURE: 100.4 F | DIASTOLIC BLOOD PRESSURE: 59 MMHG | SYSTOLIC BLOOD PRESSURE: 101 MMHG | RESPIRATION RATE: 18 BRPM | OXYGEN SATURATION: 96 %

## 2021-11-18 DIAGNOSIS — U07.1 LAB TEST POSITIVE FOR DETECTION OF COVID-19 VIRUS: Primary | ICD-10-CM

## 2021-11-18 LAB
ALBUMIN SERPL-MCNC: 4.5 G/DL (ref 3.5–4.6)
ALP BLD-CCNC: 50 U/L (ref 40–130)
ALT SERPL-CCNC: 20 U/L (ref 0–33)
ANION GAP SERPL CALCULATED.3IONS-SCNC: 13 MEQ/L (ref 9–15)
AST SERPL-CCNC: 23 U/L (ref 0–35)
BASOPHILS ABSOLUTE: 0 K/UL (ref 0–0.2)
BASOPHILS RELATIVE PERCENT: 0.5 %
BILIRUB SERPL-MCNC: <0.2 MG/DL (ref 0.2–0.7)
BUN BLDV-MCNC: 8 MG/DL (ref 6–20)
CALCIUM SERPL-MCNC: 8.9 MG/DL (ref 8.5–9.9)
CHLORIDE BLD-SCNC: 97 MEQ/L (ref 95–107)
CO2: 24 MEQ/L (ref 20–31)
CREAT SERPL-MCNC: 0.62 MG/DL (ref 0.5–0.9)
EOSINOPHILS ABSOLUTE: 0 K/UL (ref 0–0.7)
EOSINOPHILS RELATIVE PERCENT: 0.1 %
GFR AFRICAN AMERICAN: >60
GFR NON-AFRICAN AMERICAN: >60
GLOBULIN: 2.5 G/DL (ref 2.3–3.5)
GLUCOSE BLD-MCNC: 98 MG/DL (ref 70–99)
HCT VFR BLD CALC: 40.3 % (ref 37–47)
HEMOGLOBIN: 13.7 G/DL (ref 12–16)
LYMPHOCYTES ABSOLUTE: 0.9 K/UL (ref 1–4.8)
LYMPHOCYTES RELATIVE PERCENT: 16.1 %
MAGNESIUM: 2.1 MG/DL (ref 1.7–2.4)
MCH RBC QN AUTO: 30.6 PG (ref 27–31.3)
MCHC RBC AUTO-ENTMCNC: 34.1 % (ref 33–37)
MCV RBC AUTO: 89.9 FL (ref 82–100)
MONOCYTES ABSOLUTE: 0.7 K/UL (ref 0.2–0.8)
MONOCYTES RELATIVE PERCENT: 12.4 %
NEUTROPHILS ABSOLUTE: 3.9 K/UL (ref 1.4–6.5)
NEUTROPHILS RELATIVE PERCENT: 70.9 %
PDW BLD-RTO: 13.6 % (ref 11.5–14.5)
PLATELET # BLD: 125 K/UL (ref 130–400)
POTASSIUM SERPL-SCNC: 3.4 MEQ/L (ref 3.4–4.9)
RBC # BLD: 4.48 M/UL (ref 4.2–5.4)
SARS-COV-2, NAAT: DETECTED
SODIUM BLD-SCNC: 134 MEQ/L (ref 135–144)
TOTAL PROTEIN: 7 G/DL (ref 6.3–8)
WBC # BLD: 5.4 K/UL (ref 4.8–10.8)

## 2021-11-18 PROCEDURE — 71045 X-RAY EXAM CHEST 1 VIEW: CPT

## 2021-11-18 PROCEDURE — 83735 ASSAY OF MAGNESIUM: CPT

## 2021-11-18 PROCEDURE — 80053 COMPREHEN METABOLIC PANEL: CPT

## 2021-11-18 PROCEDURE — 36415 COLL VENOUS BLD VENIPUNCTURE: CPT

## 2021-11-18 PROCEDURE — 85025 COMPLETE CBC W/AUTO DIFF WBC: CPT

## 2021-11-18 PROCEDURE — 99283 EMERGENCY DEPT VISIT LOW MDM: CPT

## 2021-11-18 PROCEDURE — 87635 SARS-COV-2 COVID-19 AMP PRB: CPT

## 2021-11-18 PROCEDURE — 2580000003 HC RX 258: Performed by: NURSE PRACTITIONER

## 2021-11-18 PROCEDURE — 96374 THER/PROPH/DIAG INJ IV PUSH: CPT

## 2021-11-18 PROCEDURE — 6360000002 HC RX W HCPCS: Performed by: NURSE PRACTITIONER

## 2021-11-18 RX ORDER — DEXAMETHASONE 6 MG/1
6 TABLET ORAL DAILY
Qty: 10 TABLET | Refills: 0 | Status: SHIPPED | OUTPATIENT
Start: 2021-11-18 | End: 2021-11-18 | Stop reason: SDUPTHER

## 2021-11-18 RX ORDER — ALBUTEROL SULFATE 90 UG/1
AEROSOL, METERED RESPIRATORY (INHALATION)
Qty: 18 G | Refills: 1 | Status: SHIPPED | OUTPATIENT
Start: 2021-11-18 | End: 2022-03-22 | Stop reason: ALTCHOICE

## 2021-11-18 RX ORDER — 0.9 % SODIUM CHLORIDE 0.9 %
1000 INTRAVENOUS SOLUTION INTRAVENOUS ONCE
Status: COMPLETED | OUTPATIENT
Start: 2021-11-18 | End: 2021-11-18

## 2021-11-18 RX ORDER — ALBUTEROL SULFATE 90 UG/1
AEROSOL, METERED RESPIRATORY (INHALATION)
Qty: 18 G | Refills: 1 | Status: SHIPPED | OUTPATIENT
Start: 2021-11-18 | End: 2021-11-18 | Stop reason: SDUPTHER

## 2021-11-18 RX ORDER — DEXAMETHASONE 6 MG/1
6 TABLET ORAL DAILY
Qty: 10 TABLET | Refills: 0 | Status: SHIPPED | OUTPATIENT
Start: 2021-11-18 | End: 2021-11-28

## 2021-11-18 RX ORDER — KETOROLAC TROMETHAMINE 30 MG/ML
30 INJECTION, SOLUTION INTRAMUSCULAR; INTRAVENOUS ONCE
Status: COMPLETED | OUTPATIENT
Start: 2021-11-18 | End: 2021-11-18

## 2021-11-18 RX ADMIN — KETOROLAC TROMETHAMINE 30 MG: 30 INJECTION, SOLUTION INTRAMUSCULAR; INTRAVENOUS at 14:05

## 2021-11-18 RX ADMIN — SODIUM CHLORIDE 1000 ML: 9 INJECTION, SOLUTION INTRAVENOUS at 14:05

## 2021-11-18 ASSESSMENT — PAIN SCALES - GENERAL
PAINLEVEL_OUTOF10: 7
PAINLEVEL_OUTOF10: 6

## 2021-11-18 ASSESSMENT — PAIN DESCRIPTION - PAIN TYPE: TYPE: ACUTE PAIN

## 2021-11-18 ASSESSMENT — ENCOUNTER SYMPTOMS
BACK PAIN: 0
DIARRHEA: 1
VOMITING: 0
TROUBLE SWALLOWING: 0
COUGH: 0
SINUS PAIN: 0
SORE THROAT: 0
NAUSEA: 0
SHORTNESS OF BREATH: 0
ABDOMINAL PAIN: 0

## 2021-11-18 ASSESSMENT — PAIN DESCRIPTION - LOCATION: LOCATION: GENERALIZED

## 2021-11-18 NOTE — Clinical Note
Adrienne Leo was seen and treated in our emergency department on 11/18/2021. She may return to work on 11/25/2021. If you have any questions or concerns, please don't hesitate to call.       Vivien Whiting, APRN - CNP

## 2021-11-18 NOTE — Clinical Note
Jorje Castellano was seen and treated in our emergency department on 11/18/2021. She may return to work on 11/25/2021. If you have any questions or concerns, please don't hesitate to call.       Yamilet Vidal, APRN - CNP

## 2021-11-18 NOTE — ED TRIAGE NOTES
Pt to ED with c/o general illness, body aches, headache and diarrhea off and on for week. Hx of IBS. Skin warm and dry. No SOB, cough or edema. Lungs CTA bilat.

## 2021-11-18 NOTE — ED PROVIDER NOTES
[address] [facility] ED  eMERGENCY dEPARTMENT eNCOUnter      Pt Name: Trevin Hernandez  MRN: [id number]  [place] 1970  Date of evaluation: 11/18/2021  Provider: LUZ Brunner CNP      HISTORY OF PRESENT ILLNESS    Lorene Daugherty is a 46 y.o. female who presents to the Emergency Department with myalgias, HA, diarrhea x 4 days. Patient denies abdominal pain or vomiting. Pain is moderate. Denies cough, fever. Able to eat and drink without difficulty. REVIEW OF SYSTEMS       Review of Systems   Constitutional: Negative for activity change, appetite change and fever. HENT: Negative for congestion, drooling, ear pain, sinus pain, sore throat and trouble swallowing. Respiratory: Negative for cough and shortness of breath. Cardiovascular: Negative for chest pain. Gastrointestinal: Positive for diarrhea. Negative for abdominal pain, nausea and vomiting. Genitourinary: Negative for dysuria. Musculoskeletal: Positive for myalgias. Negative for arthralgias, back pain and neck pain. Skin: Negative for rash. Neurological: Positive for headaches. Negative for syncope and light-headedness. All other systems reviewed and are negative.         PAST MEDICAL HISTORY     Past Medical History:   Diagnosis Date    Bipolar 1 disorder ([facility] [address] 6/29/2016    Hemangioma 2016    ct abdomen, left lobe    IBS (irritable bowel syndrome)     Liver lesion, right lobe 2007, 2016    stable between 2 readings    Thoracic back pain          SURGICAL HISTORY       Past Surgical History:   Procedure Laterality Date    APPENDECTOMY  6/10/16    laparoscopic Dr Mykel Wolff  06/27/2016    w/bx     CYST REMOVAL      DENTAL SURGERY      NECK SURGERY      TUBAL LIGATION           CURRENT MEDICATIONS       Current Discharge Medication List      CONTINUE these medications which have NOT CHANGED    Details   hydrOXYzine (VISTARIL) 50 MG capsule Take 50 mg by mouth daily as needed DULoxetine (CYMBALTA) 60 MG extended release capsule Take 60 mg by mouth daily             ALLERGIES     Demerol, Meperidine, and Sulfa antibiotics    FAMILY HISTORY     History reviewed. No pertinent family history. SOCIAL HISTORY       Social History     Socioeconomic History    Marital status:      Spouse name: None    Number of children: None    Years of education: None    Highest education level: None   Occupational History    None   Tobacco Use    Smoking status: Current Every Day Smoker     Packs/day: 1.00     Years: 34.00     Pack years: 34.00     Types: Cigarettes    Smokeless tobacco: Never Used   Substance and Sexual Activity    Alcohol use: Yes     Comment: occasionally    Drug use: Yes     Types: Marijuana Darryle Pimple)    Sexual activity: Not Currently     Partners: Male   Other Topics Concern    None   Social History Narrative    None     Social Determinants of Health     Financial Resource Strain: Low Risk     Difficulty of Paying Living Expenses: Not hard at all   Food Insecurity: No Food Insecurity    Worried About Running Out of Food in the Last Year: Never true    Rachel of Food in the Last Year: Never true   Transportation Needs:     Lack of Transportation (Medical): Not on file    Lack of Transportation (Non-Medical):  Not on file   Physical Activity:     Days of Exercise per Week: Not on file    Minutes of Exercise per Session: Not on file   Stress:     Feeling of Stress : Not on file   Social Connections:     Frequency of Communication with Friends and Family: Not on file    Frequency of Social Gatherings with Friends and Family: Not on file    Attends Gnosticism Services: Not on file    Active Member of Clubs or Organizations: Not on file    Attends Club or Organization Meetings: Not on file    Marital Status: Not on file   Intimate Partner Violence:     Fear of Current or Ex-Partner: Not on file    Emotionally Abused: Not on file    Physically Abused: Not on file    Sexually Abused: Not on file   Housing Stability:     Unable to Pay for Housing in the Last Year: Not on file    Number of Places Lived in the Last Year: Not on file    Unstable Housing in the Last Year: Not on file       SCREENINGS      @FLOW([id number])@      PHYSICAL EXAM    (up to 7 for level 4, 8 or more for level 5)     ED Triage Vitals [11/18/21 1308]   BP Temp Temp Source Pulse Resp SpO2 Height Weight   106/71 100.4 °F (38 °C) Oral 89 16 96 % 5' 7\" (1.702 m) 150 lb (68 kg)       Physical Exam  Vitals and nursing note reviewed. Constitutional:       Appearance: She is well-developed. HENT:      Head: Normocephalic and atraumatic. Right Ear: Hearing, tympanic membrane, ear canal and external ear normal.      Left Ear: Hearing, tympanic membrane, ear canal and external ear normal.      Nose: Nose normal.      Mouth/Throat:      Lips: Pink. Mouth: Mucous membranes are moist.      Pharynx: Oropharynx is clear. Uvula midline. Eyes:      Conjunctiva/sclera: Conjunctivae normal.      Pupils: Pupils are equal, round, and reactive to light. Cardiovascular:      Rate and Rhythm: Normal rate and regular rhythm. Heart sounds: Normal heart sounds. Pulmonary:      Effort: Pulmonary effort is normal. No accessory muscle usage or respiratory distress. Breath sounds: Normal breath sounds. No decreased air movement. No decreased breath sounds, wheezing or rhonchi. Abdominal:      General: Bowel sounds are normal. There is no distension. Palpations: Abdomen is soft. Tenderness: There is no abdominal tenderness. Musculoskeletal:         General: Normal range of motion. Cervical back: Normal range of motion and neck supple. Skin:     General: Skin is warm and dry. Neurological:      General: No focal deficit present. Mental Status: She is alert and oriented to person, place, and time. GCS: GCS eye subscore is 4. GCS verbal subscore is 5.  GCS motor subscore is 6. Deep Tendon Reflexes: Reflexes are normal and symmetric. Psychiatric:         Judgment: Judgment normal.           All other labs were within normal range or not returned as of this dictation. EMERGENCY DEPARTMENT COURSE and DIFFERENTIALDIAGNOSIS/MDM:   Vitals:    Vitals:    11/18/21 1330 11/18/21 1407 11/18/21 1500 11/18/21 1530   BP: (!) 117/59 115/63 99/60 (!) 105/59   Pulse:  80  75   Resp:  18     Temp:       TempSrc:       SpO2: 96% 96% 95% 96%   Weight:       Height:                46 yr old female with positive for COVID. Prescriptions for Decadron and Albuterol inhaler were given to the patient. F/U With PCP as needed. Patient verbalizes understanding. PROCEDURES:  Unless otherwise noted below, none     Procedures      FINAL IMPRESSION      1.  Lab test positive for detection of COVID-19 virus          DISPOSITION/PLAN   DISPOSITION Decision To Discharge 11/18/2021 04:00:31 PM          LUZ Case CNP (electronically signed)  Attending Emergency Physician     LUZ Case CNP  11/18/21 7200

## 2021-11-19 ENCOUNTER — CARE COORDINATION (OUTPATIENT)
Dept: CARE COORDINATION | Age: 51
End: 2021-11-19

## 2021-11-22 ENCOUNTER — CARE COORDINATION (OUTPATIENT)
Dept: CARE COORDINATION | Age: 51
End: 2021-11-22

## 2021-11-22 NOTE — CARE COORDINATION
ACM called patient. Left message requesting  a return call for ED follow-up Covid monitoring. Contact information supplied.

## 2022-03-15 ENCOUNTER — OFFICE VISIT (OUTPATIENT)
Dept: FAMILY MEDICINE CLINIC | Age: 52
End: 2022-03-15
Payer: COMMERCIAL

## 2022-03-15 VITALS
HEART RATE: 73 BPM | WEIGHT: 151 LBS | DIASTOLIC BLOOD PRESSURE: 62 MMHG | SYSTOLIC BLOOD PRESSURE: 118 MMHG | TEMPERATURE: 98.6 F | BODY MASS INDEX: 23.7 KG/M2 | OXYGEN SATURATION: 96 % | HEIGHT: 67 IN

## 2022-03-15 DIAGNOSIS — Z83.3 FAMILY HISTORY OF DIABETES MELLITUS: ICD-10-CM

## 2022-03-15 DIAGNOSIS — M25.50 POLYARTHRALGIA: Primary | ICD-10-CM

## 2022-03-15 DIAGNOSIS — Z13.220 LIPID SCREENING: ICD-10-CM

## 2022-03-15 DIAGNOSIS — L65.9 HAIR LOSS: ICD-10-CM

## 2022-03-15 DIAGNOSIS — L85.3 DRY SKIN: ICD-10-CM

## 2022-03-15 DIAGNOSIS — Z12.31 ENCOUNTER FOR SCREENING MAMMOGRAM FOR MALIGNANT NEOPLASM OF BREAST: ICD-10-CM

## 2022-03-15 DIAGNOSIS — G89.29 CHRONIC PAIN OF BOTH ANKLES: ICD-10-CM

## 2022-03-15 DIAGNOSIS — M25.572 CHRONIC PAIN OF BOTH ANKLES: ICD-10-CM

## 2022-03-15 DIAGNOSIS — M25.571 CHRONIC PAIN OF BOTH ANKLES: ICD-10-CM

## 2022-03-15 PROCEDURE — 99214 OFFICE O/P EST MOD 30 MIN: CPT | Performed by: NURSE PRACTITIONER

## 2022-03-15 RX ORDER — METHYLPREDNISOLONE 4 MG/1
TABLET ORAL
Qty: 1 KIT | Refills: 0 | Status: SHIPPED | OUTPATIENT
Start: 2022-03-15 | End: 2022-03-22 | Stop reason: ALTCHOICE

## 2022-03-15 ASSESSMENT — ENCOUNTER SYMPTOMS
COLOR CHANGE: 0
ABDOMINAL PAIN: 0
TROUBLE SWALLOWING: 0
SHORTNESS OF BREATH: 0
DIARRHEA: 0
CHEST TIGHTNESS: 0
ROS SKIN COMMENTS: DRY ITCHY SKIN
BACK PAIN: 0
CONSTIPATION: 0
COUGH: 0
EYE PAIN: 0

## 2022-03-15 NOTE — PROGRESS NOTES
Subjective  Chief Complaint   Patient presents with    Joint Pain     always had but got worse about a month ago    Alopecia     pt states started about a month ago    Blood Work     like to talk about getting some labs done       HPI    Pt here for multiple issues as above. Hair loss-started approx 1 month ago, did just get hair trimmed and showed no bald spots. Dry skin-itchy all the time. Joint pain-has had to miss work because of this, feels like ankles will lock up, only improves with \"a lot of ibuprofen\". There are times where she \"can't walk\". Pain also in hips and lower back. Biggest issue though is her ankles. Sister was recently diagnosed with diabetes. Brother also has had diabetes for several years. Has not had period in 2 years. Quit drinking alcohol 9 months ago. 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 Has been missing a lot of work d/t these issues and is now putting in for medical leave. Is putting in for continuous medical leave-missed Thursday and Friday of last week and Monday of this week. States she feels she has not been able to work at all since then. \"I crawl around my house\". Feels all of this is starting to impact her mental health also. States she was on naprosyn several years ago but was not that good at taking it at that time. Also mentions varicose veins mainly on inner upper thighs.     Past Medical History:   Diagnosis Date    Bipolar 1 disorder ([address] 6/29/2016    Hemangioma 2016    ct abdomen, left lobe    IBS (irritable bowel syndrome)     Liver lesion, right lobe 2007, 2016    stable between 2 readings    Thoracic back pain      Patient Active Problem List    Diagnosis Date Noted    Thoracic back pain     Bipolar 1 disorder ([facility] [address] 06/29/2016    Liver lesion, right lobe     Hemangioma 01/01/2016    Subscapularis tendinitis 03/21/2013    Chest pain 03/21/2013 Past Surgical History:   Procedure Laterality Date    APPENDECTOMY  6/10/16    laparoscopic Dr Edita Uribe  06/27/2016    w/bx     CYST REMOVAL      DENTAL SURGERY      NECK SURGERY      TUBAL LIGATION       No family history on file. Social History     Socioeconomic History    Marital status:      Spouse name: None    Number of children: None    Years of education: None    Highest education level: None   Occupational History    None   Tobacco Use    Smoking status: Current Every Day Smoker     Packs/day: 1.00     Years: 34.00     Pack years: 34.00     Types: Cigarettes    Smokeless tobacco: Never Used   Substance and Sexual Activity    Alcohol use: Yes     Comment: occasionally    Drug use: Yes     Types: Marijuana Don Safer)    Sexual activity: Not Currently     Partners: Male   Other Topics Concern    None   Social History Narrative    None     Social Determinants of Health     Financial Resource Strain: Low Risk     Difficulty of Paying Living Expenses: Not hard at all   Food Insecurity: No Food Insecurity    Worried About Running Out of Food in the Last Year: Never true    Rachel of Food in the Last Year: Never true   Transportation Needs:     Lack of Transportation (Medical): Not on file    Lack of Transportation (Non-Medical):  Not on file   Physical Activity:     Days of Exercise per Week: Not on file    Minutes of Exercise per Session: Not on file   Stress:     Feeling of Stress : Not on file   Social Connections:     Frequency of Communication with Friends and Family: Not on file    Frequency of Social Gatherings with Friends and Family: Not on file    Attends Religion Services: Not on file    Active Member of Clubs or Organizations: Not on file    Attends Club or Organization Meetings: Not on file    Marital Status: Not on file   Intimate Partner Violence:     Fear of Current or Ex-Partner: Not on file    Emotionally Abused: Not on file   Prince Maher Physically Abused: Not on file    Sexually Abused: Not on file   Housing Stability:     Unable to Pay for Housing in the Last Year: Not on file    Number of Places Lived in the Last Year: Not on file    Unstable Housing in the Last Year: Not on file     Current Outpatient Medications on File Prior to Visit   Medication Sig Dispense Refill    DULoxetine (CYMBALTA) 60 MG extended release capsule Take 60 mg by mouth daily      albuterol sulfate HFA (PROAIR HFA) 108 (90 Base) MCG/ACT inhaler Use every 4 hours while awake for 7-10 days then PRN wheezing  Dispense with SPACER and Instruct on use. May sub Ventolin or Proventil as needed per [employer]. (Patient not taking: Reported on 3/15/2022) 18 g 1     No current facility-administered medications on file prior to visit. Allergies   Allergen Reactions    Demerol     Meperidine Itching    Sulfa Antibiotics        Review of Systems   Constitutional: Positive for fatigue. Negative for activity change, appetite change, chills, diaphoresis and fever. HENT: Negative for congestion, ear pain, hearing loss and trouble swallowing. Eyes: Negative for pain and visual disturbance. Respiratory: Negative for cough, chest tightness and shortness of breath. Cardiovascular: Negative for chest pain, palpitations and leg swelling. Gastrointestinal: Negative for abdominal pain, constipation and diarrhea. Endocrine: Negative for polydipsia, polyphagia and polyuria. Hair loss   Genitourinary: Negative for difficulty urinating and dysuria. Musculoskeletal: Positive for arthralgias and myalgias. Negative for back pain. Skin: Negative for color change and rash. Dry itchy skin   Neurological: Negative for dizziness and light-headedness. Psychiatric/Behavioral: Negative for dysphoric mood. The patient is not nervous/anxious.         Objective  Vitals:    03/15/22 1054   BP: 118/62   Site: Left Upper Arm   Position: Sitting   Cuff Size: Medium Adult Pulse: 73   Temp: 98.6 °F (37 °C)   TempSrc: Infrared   SpO2: 96%   Weight: 151 lb (68.5 kg)   Height: 5' 7\" (1.702 m)     Physical Exam  Vitals reviewed. Constitutional:       General: She is not in acute distress. Appearance: Normal appearance. She is well-developed and normal weight. She is not ill-appearing, toxic-appearing or diaphoretic. HENT:      Head: Normocephalic and atraumatic. Right Ear: Hearing, tympanic membrane, ear canal and external ear normal. There is no impacted cerumen. Left Ear: Hearing, tympanic membrane, ear canal and external ear normal. There is no impacted cerumen. Nose: Nose normal. No congestion or rhinorrhea. Mouth/Throat:      Mouth: Mucous membranes are moist.      Pharynx: Oropharynx is clear. No oropharyngeal exudate or posterior oropharyngeal erythema. Eyes:      General:         Right eye: No discharge. Left eye: No discharge. Conjunctiva/sclera: Conjunctivae normal.      Pupils: Pupils are equal, round, and reactive to light. Neck:      Thyroid: No thyromegaly. Cardiovascular:      Rate and Rhythm: Normal rate and regular rhythm. Pulses: Normal pulses. Heart sounds: Normal heart sounds. No murmur heard. Pulmonary:      Effort: Pulmonary effort is normal. No respiratory distress. Breath sounds: Normal breath sounds. No stridor. No wheezing, rhonchi or rales. Chest:      Chest wall: No tenderness. Musculoskeletal:         General: Normal range of motion. Cervical back: Neck supple. No tenderness. Right lower leg: No edema. Left lower leg: No edema. Lymphadenopathy:      Cervical: No cervical adenopathy. Skin:     General: Skin is warm and dry. Capillary Refill: Capillary refill takes less than 2 seconds. Coloration: Skin is not jaundiced or pale. Findings: No bruising, erythema, lesion or rash. Neurological:      General: No focal deficit present.       Mental Status: She is alert and oriented to person, place, and time. Mental status is at baseline. Cranial Nerves: No cranial nerve deficit. Coordination: Coordination normal.      Gait: Gait normal.   Psychiatric:         Mood and Affect: Mood normal.         Speech: Speech normal.         Behavior: Behavior normal.         Thought Content: Thought content normal.         Judgment: Judgment normal.         Assessment& Plan     Diagnosis Orders   1. Polyarthralgia  CBC    Comprehensive Metabolic Panel    Sedimentation Rate    C-Reactive Protein    Rheumatoid Factor    methylPREDNISolone (MEDROL, TERRY,) 4 MG tablet   2. Hair loss  TSH with Reflex   3. Family history of diabetes mellitus  Hemoglobin A1C   4. Dry skin  TSH with Reflex   5. Chronic pain of both ankles  Bessie Ortega, SAILAJA, Podiatry, [facility]   6. Encounter for screening mammogram for malignant neoplasm of breast  ANDREY DIGITAL SCREEN W OR WO CAD BILATERAL   7. Lipid screening  Lipid Panel     Check labs as ordered. Referral to podiatry. Andrey as ordered. Medrol dosepack, may consider treatment with naprosyn at f/u visit. F/u in 1 week or sooner PRN. Off work this week d/t significant discomfort limiting ability to perform duties of job. Side effects, adverse effects of the medication prescribed today, as well as treatment plan/ rationale and result expectations have been discussed with the patient who expresses understanding and desires to proceed. Close follow up to evaluate treatment results and for coordination of care. I have reviewed the patient's medical history in detail and updated the computerized patient record. As always, patient is advised that if symptoms worsen in any way they will proceed to the nearest emergency room.        Orders Placed This Encounter   Procedures    ANDREY DIGITAL SCREEN W OR WO CAD BILATERAL     Standing Status:   Future     Standing Expiration Date:   5/15/2023     Order Specific Question:   Reason for exam: Answer:   breast cancer screening    CBC     Standing Status:   Future     Standing Expiration Date:   3/15/2023    Comprehensive Metabolic Panel     Standing Status:   Future     Standing Expiration Date:   3/15/2023    TSH with Reflex     Standing Status:   Future     Standing Expiration Date:   3/15/2023    Sedimentation Rate     Standing Status:   Future     Standing Expiration Date:   3/15/2023    C-Reactive Protein     Standing Status:   Future     Standing Expiration Date:   3/15/2023    Hemoglobin A1C     Standing Status:   Future     Standing Expiration Date:   3/15/2023    Rheumatoid Factor     Standing Status:   Future     Standing Expiration Date:   3/15/2023    Lipid Panel     Standing Status:   Future     Standing Expiration Date:   3/15/2023     Order Specific Question:   Is Patient Fasting?/# of Hours     Answer:   [id number] Alfredito Bah DPM, Podiatry, [facility]     Referral Priority:   Routine     Referral Type:   Eval and Treat     Referral Reason:   Specialty Services Required     Referred to Provider:   Ashley Lawrence DPM     Requested Specialty:   Podiatry     Number of Visits Requested:   1       Orders Placed This Encounter   Medications    methylPREDNISolone (MEDROL, TERRY,) 4 MG tablet     Sig: Take by mouth. Dispense:  1 kit     Refill:  0       Medications Discontinued During This Encounter   Medication Reason    hydrOXYzine (VISTARIL) 50 MG capsule Patient Choice       Return in about 1 week (around 3/22/2022) for check up multiple issues, 20 minutes.     Fausto Jolly, APRN - CNP

## 2022-03-17 DIAGNOSIS — Z13.220 LIPID SCREENING: ICD-10-CM

## 2022-03-17 DIAGNOSIS — M25.50 POLYARTHRALGIA: ICD-10-CM

## 2022-03-17 DIAGNOSIS — Z83.3 FAMILY HISTORY OF DIABETES MELLITUS: ICD-10-CM

## 2022-03-17 DIAGNOSIS — L85.3 DRY SKIN: ICD-10-CM

## 2022-03-17 DIAGNOSIS — L65.9 HAIR LOSS: ICD-10-CM

## 2022-03-17 LAB
ALBUMIN SERPL-MCNC: 4.3 G/DL (ref 3.5–4.6)
ALP BLD-CCNC: 55 U/L (ref 40–130)
ALT SERPL-CCNC: 12 U/L (ref 0–33)
ANION GAP SERPL CALCULATED.3IONS-SCNC: 12 MEQ/L (ref 9–15)
AST SERPL-CCNC: 23 U/L (ref 0–35)
BILIRUB SERPL-MCNC: <0.2 MG/DL (ref 0.2–0.7)
BUN BLDV-MCNC: 11 MG/DL (ref 6–20)
C-REACTIVE PROTEIN: <3 MG/L (ref 0–5)
CALCIUM SERPL-MCNC: 9.1 MG/DL (ref 8.5–9.9)
CHLORIDE BLD-SCNC: 107 MEQ/L (ref 95–107)
CHOLESTEROL, TOTAL: 232 MG/DL (ref 0–199)
CO2: 22 MEQ/L (ref 20–31)
CREAT SERPL-MCNC: 0.48 MG/DL (ref 0.5–0.9)
GFR AFRICAN AMERICAN: >60
GFR NON-AFRICAN AMERICAN: >60
GLOBULIN: 2.1 G/DL (ref 2.3–3.5)
GLUCOSE BLD-MCNC: 89 MG/DL (ref 70–99)
HBA1C MFR BLD: 5.8 % (ref 4.8–5.9)
HCT VFR BLD CALC: 41.3 % (ref 37–47)
HDLC SERPL-MCNC: 59 MG/DL (ref 40–59)
HEMOGLOBIN: 13.5 G/DL (ref 12–16)
LDL CHOLESTEROL CALCULATED: 139 MG/DL (ref 0–129)
MCH RBC QN AUTO: 30.3 PG (ref 27–31.3)
MCHC RBC AUTO-ENTMCNC: 32.8 % (ref 33–37)
MCV RBC AUTO: 92.3 FL (ref 82–100)
PDW BLD-RTO: 15 % (ref 11.5–14.5)
PLATELET # BLD: 206 K/UL (ref 130–400)
POTASSIUM SERPL-SCNC: 4.2 MEQ/L (ref 3.4–4.9)
RBC # BLD: 4.47 M/UL (ref 4.2–5.4)
SEDIMENTATION RATE, ERYTHROCYTE: 2 MM (ref 0–30)
SODIUM BLD-SCNC: 141 MEQ/L (ref 135–144)
TOTAL PROTEIN: 6.4 G/DL (ref 6.3–8)
TRIGL SERPL-MCNC: 170 MG/DL (ref 0–150)
TSH REFLEX: 3.42 UIU/ML (ref 0.44–3.86)
WBC # BLD: 6 K/UL (ref 4.8–10.8)

## 2022-03-18 LAB — RHEUMATOID FACTOR: 10.4 IU/ML

## 2022-03-22 ENCOUNTER — TELEMEDICINE (OUTPATIENT)
Dept: FAMILY MEDICINE CLINIC | Age: 52
End: 2022-03-22
Payer: COMMERCIAL

## 2022-03-22 DIAGNOSIS — M25.571 CHRONIC PAIN OF BOTH ANKLES: ICD-10-CM

## 2022-03-22 DIAGNOSIS — M25.50 POLYARTHRALGIA: Primary | ICD-10-CM

## 2022-03-22 DIAGNOSIS — M25.572 CHRONIC PAIN OF BOTH ANKLES: ICD-10-CM

## 2022-03-22 DIAGNOSIS — G89.29 CHRONIC PAIN OF BOTH ANKLES: ICD-10-CM

## 2022-03-22 PROCEDURE — 99442 PR PHYS/QHP TELEPHONE EVALUATION 11-20 MIN: CPT | Performed by: NURSE PRACTITIONER

## 2022-03-22 RX ORDER — MELOXICAM 7.5 MG/1
7.5 TABLET ORAL DAILY
Qty: 90 TABLET | Refills: 1 | Status: SHIPPED | OUTPATIENT
Start: 2022-03-22 | End: 2022-09-20

## 2022-03-22 ASSESSMENT — PATIENT HEALTH QUESTIONNAIRE - PHQ9
SUM OF ALL RESPONSES TO PHQ QUESTIONS 1-9: 0
10. IF YOU CHECKED OFF ANY PROBLEMS, HOW DIFFICULT HAVE THESE PROBLEMS MADE IT FOR YOU TO DO YOUR WORK, TAKE CARE OF THINGS AT HOME, OR GET ALONG WITH OTHER PEOPLE: 0
8. MOVING OR SPEAKING SO SLOWLY THAT OTHER PEOPLE COULD HAVE NOTICED. OR THE OPPOSITE, BEING SO FIGETY OR RESTLESS THAT YOU HAVE BEEN MOVING AROUND A LOT MORE THAN USUAL: 0
3. TROUBLE FALLING OR STAYING ASLEEP: 0
SUM OF ALL RESPONSES TO PHQ9 QUESTIONS 1 & 2: 0
7. TROUBLE CONCENTRATING ON THINGS, SUCH AS READING THE NEWSPAPER OR WATCHING TELEVISION: 0
SUM OF ALL RESPONSES TO PHQ QUESTIONS 1-9: 0
2. FEELING DOWN, DEPRESSED OR HOPELESS: 0
1. LITTLE INTEREST OR PLEASURE IN DOING THINGS: 0
4. FEELING TIRED OR HAVING LITTLE ENERGY: 0
6. FEELING BAD ABOUT YOURSELF - OR THAT YOU ARE A FAILURE OR HAVE LET YOURSELF OR YOUR FAMILY DOWN: 0
SUM OF ALL RESPONSES TO PHQ QUESTIONS 1-9: 0
5. POOR APPETITE OR OVEREATING: 0
9. THOUGHTS THAT YOU WOULD BE BETTER OFF DEAD, OR OF HURTING YOURSELF: 0
SUM OF ALL RESPONSES TO PHQ QUESTIONS 1-9: 0

## 2022-03-22 ASSESSMENT — ENCOUNTER SYMPTOMS
COUGH: 0
CONSTIPATION: 0
SHORTNESS OF BREATH: 0
ABDOMINAL PAIN: 0
EYE PAIN: 0
DIARRHEA: 0
COLOR CHANGE: 0
BACK PAIN: 0
CHEST TIGHTNESS: 0
TROUBLE SWALLOWING: 0

## 2022-03-22 NOTE — PROGRESS NOTES
 APPENDECTOMY  6/10/16    laparoscopic Dr Cheung Poag COLONOSCOPY  06/27/2016    w/bx     CYST REMOVAL      DENTAL SURGERY      NECK SURGERY      TUBAL LIGATION       Social History     Socioeconomic History    Marital status:      Spouse name: Not on file    Number of children: Not on file    Years of education: Not on file    Highest education level: Not on file   Occupational History    Not on file   Tobacco Use    Smoking status: Current Every Day Smoker     Packs/day: 1.00     Years: 34.00     Pack years: 34.00     Types: Cigarettes    Smokeless tobacco: Never Used   Substance and Sexual Activity    Alcohol use: Yes     Comment: occasionally    Drug use: Yes     Types: Marijuana Fredick Copping)    Sexual activity: Not Currently     Partners: Male   Other Topics Concern    Not on file   Social History Narrative    Not on file     Social Determinants of Health     Financial Resource Strain: Low Risk     Difficulty of Paying Living Expenses: Not hard at all   Food Insecurity: No Food Insecurity    Worried About Running Out of Food in the Last Year: Never true    Rachel of Food in the Last Year: Never true   Transportation Needs:     Lack of Transportation (Medical): Not on file    Lack of Transportation (Non-Medical):  Not on file   Physical Activity:     Days of Exercise per Week: Not on file    Minutes of Exercise per Session: Not on file   Stress:     Feeling of Stress : Not on file   Social Connections:     Frequency of Communication with Friends and Family: Not on file    Frequency of Social Gatherings with Friends and Family: Not on file    Attends Hinduism Services: Not on file    Active Member of Clubs or Organizations: Not on file    Attends Club or Organization Meetings: Not on file    Marital Status: Not on file   Intimate Partner Violence:     Fear of Current or Ex-Partner: Not on file    Emotionally Abused: Not on file    Physically Abused: Not on file   Che Vazquez Sexually Abused: Not on file   Housing Stability:     Unable to Pay for Housing in the Last Year: Not on file    Number of Places Lived in the Last Year: Not on file    Unstable Housing in the Last Year: Not on file     No family history on file. Allergies:  Demerol, Meperidine, and Sulfa antibiotics    Review of Systems   Constitutional: Negative for activity change, appetite change, chills, diaphoresis, fatigue and fever. HENT: Negative for congestion, ear pain, hearing loss and trouble swallowing. Eyes: Negative for pain and visual disturbance. Respiratory: Negative for cough, chest tightness and shortness of breath. Cardiovascular: Negative for chest pain, palpitations and leg swelling. Gastrointestinal: Negative for abdominal pain, constipation and diarrhea. Endocrine: Negative for polydipsia, polyphagia and polyuria. Genitourinary: Negative for difficulty urinating and dysuria. Musculoskeletal: Positive for arthralgias. Negative for back pain. Skin: Negative for color change and rash. Neurological: Negative for dizziness and light-headedness. Psychiatric/Behavioral: Negative for dysphoric mood. The patient is not nervous/anxious.           PHYSICAL EXAM/ RESULTS    [facility] 07/14/2020     Vitals signs: pt does not have the proper equipment to take all VS.    The physical is not performed due to the visit being a telephone counter as indicated due to the restrictions of the COVID-19 pandemic    Recent Results (from the past 2016 hour(s))   Lipid Panel    Collection Time: 03/17/22  8:57 AM   Result Value Ref Range    Cholesterol, Total 232 (H) 0 - 199 mg/dL    Triglycerides 170 (H) 0 - 150 mg/dL    HDL 59 40 - 59 mg/dL    LDL Calculated 139 (H) 0 - 129 mg/dL   Rheumatoid Factor    Collection Time: 03/17/22  8:57 AM   Result Value Ref Range    Rheumatoid Factor 10.4 <14 IU/mL   Hemoglobin A1C    Collection Time: 03/17/22  8:57 AM   Result Value Ref Range    Hemoglobin A1C 5.8 4.8 - 5.9 % C-Reactive Protein    Collection Time: 03/17/22  8:57 AM   Result Value Ref Range    CRP <3.0 0.0 - 5.0 mg/L   Sedimentation Rate    Collection Time: 03/17/22  8:57 AM   Result Value Ref Range    Sed Rate 2 0 - 30 mm   TSH with Reflex    Collection Time: 03/17/22  8:57 AM   Result Value Ref Range    TSH 3.420 0.440 - 3.860 uIU/mL   Comprehensive Metabolic Panel    Collection Time: 03/17/22  8:57 AM   Result Value Ref Range    Sodium 141 135 - 144 mEq/L    Potassium 4.2 3.4 - 4.9 mEq/L    Chloride 107 95 - 107 mEq/L    CO2 22 20 - 31 mEq/L    Anion Gap 12 9 - 15 mEq/L    Glucose 89 70 - 99 mg/dL    BUN 11 6 - 20 mg/dL    CREATININE 0.48 (L) 0.50 - 0.90 mg/dL    GFR Non-African American >60.0 >60    GFR  >60.0 >60    Calcium 9.1 8.5 - 9.9 mg/dL    Total Protein 6.4 6.3 - 8.0 g/dL    Albumin 4.3 3.5 - 4.6 g/dL    Total Bilirubin <0.2 0.2 - 0.7 mg/dL    Alkaline Phosphatase 55 40 - 130 U/L    ALT 12 0 - 33 U/L    AST 23 0 - 35 U/L    Globulin 2.1 (L) 2.3 - 3.5 g/dL   CBC    Collection Time: 03/17/22  8:57 AM   Result Value Ref Range    WBC 6.0 4.8 - 10.8 K/uL    RBC 4.47 4.20 - 5.40 M/uL    Hemoglobin 13.5 12.0 - 16.0 g/dL    Hematocrit 41.3 37.0 - 47.0 %    MCV 92.3 82.0 - 100.0 fL    MCH 30.3 27.0 - 31.3 pg    MCHC 32.8 (L) 33.0 - 37.0 %    RDW 15.0 (H) 11.5 - 14.5 %    Platelets [phone] - 755 K/uL           Assessment:       Diagnosis Orders   1. Polyarthralgia  meloxicam (MOBIC) 7.5 MG tablet   2. Chronic pain of both ankles  meloxicam (MOBIC) 7.5 MG tablet     Discontinue ibuprofen use. Start meloxicam daily. Okay to return to work. F/u with podiatry as scheduled. Reviewed all labs which were okay. F/u if symptoms worsen or fail to improve. Side effects, adverse effects of the medication prescribed today, as well as treatment plan/ rationale and result expectations have been discussed with the patient who expresses understanding and desires to proceed.     Close follow up to evaluate treatment results and for coordination of care. I have reviewed the patient's medical history in detail and updated the computerized patient record. As always, patient is advised that if symptoms worsen in any way they will proceed to the nearest emergency room. No orders of the defined types were placed in this encounter. Plan:   Return if symptoms worsen or fail to improve. There are no Patient Instructions on file for this visit. This visit ended at [id number].     LUZ Joshi, NP-C.

## 2022-03-30 ENCOUNTER — HOSPITAL ENCOUNTER (OUTPATIENT)
Dept: WOMENS IMAGING | Age: 52
Discharge: HOME | End: 2022-04-01
Payer: COMMERCIAL

## 2022-03-30 DIAGNOSIS — Z12.31 ENCOUNTER FOR SCREENING MAMMOGRAM FOR MALIGNANT NEOPLASM OF BREAST: ICD-10-CM

## 2022-03-30 PROCEDURE — 77063 BREAST TOMOSYNTHESIS BI: CPT

## 2022-04-05 ENCOUNTER — HOSPITAL ENCOUNTER (OUTPATIENT)
Age: 52
Discharge: HOME | End: 2022-04-07
Payer: COMMERCIAL

## 2022-04-05 ENCOUNTER — INITIAL CONSULT (OUTPATIENT)
Dept: PODIATRY | Age: 52
End: 2022-04-05
Payer: COMMERCIAL

## 2022-04-05 ENCOUNTER — TELEPHONE (OUTPATIENT)
Dept: FAMILY MEDICINE CLINIC | Age: 52
End: 2022-04-05

## 2022-04-05 ENCOUNTER — HOSPITAL ENCOUNTER (OUTPATIENT)
Dept: GENERAL RADIOLOGY | Age: 52
Discharge: HOME | End: 2022-04-07
Payer: COMMERCIAL

## 2022-04-05 VITALS — WEIGHT: 160 LBS | BODY MASS INDEX: 25.11 KG/M2 | HEIGHT: 67 IN

## 2022-04-05 DIAGNOSIS — G57.91 NEURITIS OF RIGHT FOOT: ICD-10-CM

## 2022-04-05 DIAGNOSIS — M21.621 TAILOR'S BUNIONETTE, BILATERAL: ICD-10-CM

## 2022-04-05 DIAGNOSIS — M19.072 ARTHRITIS OF BOTH FEET: ICD-10-CM

## 2022-04-05 DIAGNOSIS — M79.671 PAIN IN BOTH FEET: ICD-10-CM

## 2022-04-05 DIAGNOSIS — M21.622 TAILOR'S BUNIONETTE, BILATERAL: ICD-10-CM

## 2022-04-05 DIAGNOSIS — M20.12 HALLUX ABDUCTO VALGUS, BILATERAL: ICD-10-CM

## 2022-04-05 DIAGNOSIS — M20.11 HALLUX ABDUCTO VALGUS, BILATERAL: ICD-10-CM

## 2022-04-05 DIAGNOSIS — M79.672 PAIN IN BOTH FEET: Primary | ICD-10-CM

## 2022-04-05 DIAGNOSIS — M19.071 ARTHRITIS OF BOTH FEET: ICD-10-CM

## 2022-04-05 DIAGNOSIS — M79.672 PAIN IN BOTH FEET: ICD-10-CM

## 2022-04-05 DIAGNOSIS — M79.671 PAIN IN BOTH FEET: Primary | ICD-10-CM

## 2022-04-05 DIAGNOSIS — G57.92 NEURITIS OF LEFT FOOT: ICD-10-CM

## 2022-04-05 PROCEDURE — 99243 OFF/OP CNSLTJ NEW/EST LOW 30: CPT | Performed by: PODIATRIST

## 2022-04-05 PROCEDURE — 73630 X-RAY EXAM OF FOOT: CPT

## 2022-04-05 ASSESSMENT — ENCOUNTER SYMPTOMS
SHORTNESS OF BREATH: 0
NAUSEA: 0
VOMITING: 0
BACK PAIN: 1

## 2022-04-05 NOTE — TELEPHONE ENCOUNTER
CARLENE    Nurse from [employer] to let Félix Noel know that she can call her if pt has any change of medical necessity or concerns.        Carlene Morataya # [id number]

## 2022-04-05 NOTE — PROGRESS NOTES
[address] [facility]  Dept: [phone]  Loc: [phone]       Marv Brown  (1970)    4/5/22    Subjective     Marv Brown is 46 y.o. female who complains today of:    Chief Complaint   Patient presents with    Ankle Pain     both ankles    Foot Pain     both feet       Marv Brown is seen in consultation at the request of LUZ Carias CNP for evaluation of ankle pain. HPI: Patient presents with complaint of bilateral foot and ankle pain. Patient states that the pain has been present for several weeks. Patient rates the pain at 10/10. She states that the pain has been stable and is not sure of any prior trauma. Patient describes the character of the pain as sharp, achy, and shooting. Previous treatments have included medication including steroids and nonsteroidal anti-inflammatories. Patient also bought soft ankle braces and over-the-counter inserts where she works at [employer]. She found that these do not really decrease her pain. She states that standing for long hours increases her pain. Patient states she is finding it difficult to walk or stand for more than a few minutes. She is currently on leave from work, she works as an . This position requires her to stand, bend, lift, and reach on a regular basis. Patient does report a history of lower back problems as well as fibromyalgia. Patient reports a history of \"weak ankles\" in her family. Review of Systems   Constitutional: Negative for chills and fever. HENT: Negative for hearing loss. Respiratory: Negative for shortness of breath. Cardiovascular: Negative for chest pain. Gastrointestinal: Negative for nausea and vomiting. Genitourinary: Negative for difficulty urinating. Musculoskeletal: Positive for back pain and gait problem.    Skin: Negative for wound.   Neurological: Negative for numbness. Hematological: Does not bruise/bleed easily. Psychiatric/Behavioral: Negative for sleep disturbance. The patient is not a diabetic. Allergies:  Demerol, Meperidine, and Sulfa antibiotics    Current Outpatient Medications on File Prior to Visit   Medication Sig Dispense Refill    DULoxetine (CYMBALTA) 60 MG extended release capsule Take 90 mg by mouth daily       meloxicam (MOBIC) 7.5 MG tablet Take 1 tablet by mouth daily 90 tablet 1     No current facility-administered medications on file prior to visit.        Past Medical History:   Diagnosis Date    Bipolar 1 disorder ([address] 6/29/2016    Hemangioma 2016    ct abdomen, left lobe    IBS (irritable bowel syndrome)     Liver lesion, right lobe 2007, 2016    stable between 2 readings    Thoracic back pain      Past Surgical History:   Procedure Laterality Date    APPENDECTOMY  6/10/16    laparoscopic Dr Nancy Dorantes COLONOSCOPY  06/27/2016    w/bx     CYST REMOVAL      DENTAL SURGERY      NECK SURGERY      TUBAL LIGATION       Social History     Socioeconomic History    Marital status:      Spouse name: Not on file    Number of children: Not on file    Years of education: Not on file    Highest education level: Not on file   Occupational History    Not on file   Tobacco Use    Smoking status: Current Every Day Smoker     Packs/day: 1.00     Years: 34.00     Pack years: 34.00     Types: Cigarettes    Smokeless tobacco: Never Used   Substance and Sexual Activity    Alcohol use: Yes     Comment: occasionally    Drug use: Yes     Types: Marijuana Kp Bilberry)    Sexual activity: Not Currently     Partners: Male   Other Topics Concern    Not on file   Social History Narrative    Not on file     Social Determinants of Health     Financial Resource Strain: Low Risk     Difficulty of Paying Living Expenses: Not hard at all   Food Insecurity: No Food Insecurity    Worried About Running Out of Food in the Last Year: Never true    Rachel of Food in the Last Year: Never true   Transportation Needs:     Lack of Transportation (Medical): Not on file    Lack of Transportation (Non-Medical): Not on file   Physical Activity:     Days of Exercise per Week: Not on file    Minutes of Exercise per Session: Not on file   Stress:     Feeling of Stress : Not on file   Social Connections:     Frequency of Communication with Friends and Family: Not on file    Frequency of Social Gatherings with Friends and Family: Not on file    Attends Synagogue Services: Not on file    Active Member of [address] [place] [address] [email or web address] or Organizations: Not on file    Attends Club or Organization Meetings: Not on file    Marital Status: Not on file   Intimate Partner Violence:     Fear of Current or Ex-Partner: Not on file    Emotionally Abused: Not on file    Physically Abused: Not on file    Sexually Abused: Not on file   Housing Stability:     Unable to Pay for Housing in the Last Year: Not on file    Number of [place] in the Last Year: Not on file    Unstable Housing in the Last Year: Not on file     Family History   Problem Relation Age of Onset    Breast Cancer Paternal Aunt            Not objective:   Vitals:  Ht 5' 7\" (1.702 m)   Wt 160 lb (72.6 kg)   LMP 07/14/2020   BMI 25.06 kg/m² Pain Score:  10 - Worst pain ever      Physical Exam  Vitals reviewed. Constitutional:       Appearance: She is normal weight. HENT:      Head: Normocephalic and atraumatic. Pulmonary:      Effort: Pulmonary effort is normal.   Musculoskeletal:      Right foot: Deformity and bunion present. Left foot: Deformity and bunion present. Feet:      Right foot:      Protective Sensation: 10 sites tested. 10 sites sensed. Skin integrity: Callus present. Toenail Condition: Right toenails are normal.      Left foot:      Protective Sensation: 10 sites tested. 10 sites sensed.       Skin integrity: Callus present. Toenail Condition: Left toenails are normal.      Comments: Rectus foot type noted bilaterally. MMT graded 5/5 for all extrinsic foot muscle groups bilaterally. Hallux abductovalgus deformity with functional hallux limitus noted bilaterally. Tailor's bunionette deformity with adductovarus rotation deformity of the fifth toe noted bilaterally. Stiffness without decreased range of motion or crepitus noted with range of motion of the bilateral subtalar joint and a joint. There is tenderness to palpation of the bilateral sinus tarsi as well as the medial gutters. Bony prominence noted at the tarsometatarsal joints of bilateral stent, no pain with range of motion of the corresponding joints. Decreased ankle joint dorsiflexion noted bilaterally, this improves with knee flexion. No ligamentous laxity noted to the bilateral ankle. Lymphadenopathy:      Comments: Popliteal lymph nodes are soft and nontender. Skin:     General: Skin is warm and dry. Capillary Refill: Capillary refill takes less than 2 seconds. Comments: No open lesions noted bilaterally. Normal skin turgor noted bilaterally. Focal hyperkeratotic lesions noted to the bilateral foot at the head of the fifth dorsal, at the medial aspect of the bilateral hallux, and the medial aspect of the right heel. Neurological:      Mental Status: She is alert and oriented to person, place, and time. Deep Tendon Reflexes: Babinski sign absent on the right side. Babinski sign absent on the left side. Reflex Scores:       Patellar reflexes are 1+ on the right side and 1+ on the left side. Achilles reflexes are 0 on the right side and 0 on the left side. Comments: No ankle clonus noted bilaterally. Positive Tinel's sign noted with percussion of the right saphenous nerve, superficial peroneal nerve and tibial nerve.   Positive Tinel's sign noted with percussion of the left common peroneal nerve, superficial peroneal nerve, deep peroneal nerve, and tibial nerve. Vibratory sensation is intact bilaterally. Psychiatric:         Mood and Affect: Mood normal.         Behavior: Behavior normal.         Assessment:      Diagnosis Orders   1. Pain in both feet  XR FOOT RIGHT (MIN 3 VIEWS)    XR FOOT LEFT (MIN 3 VIEWS)    EMG   2. Neuritis of right foot  EMG   3. Neuritis of left foot  EMG   4. Arthritis of both feet  XR FOOT RIGHT (MIN 3 VIEWS)    XR FOOT LEFT (MIN 3 VIEWS)   5. Hallux abducto valgus, bilateral  XR FOOT RIGHT (MIN 3 VIEWS)    XR FOOT LEFT (MIN 3 VIEWS)   6. Tailor's bunionette, bilateral  XR FOOT RIGHT (MIN 3 VIEWS)    XR FOOT LEFT (MIN 3 VIEWS)       Plan:     Bilateral foot arthritis/deformity: Radiographs of bilateral foot were ordered. The images will be reviewed at follow-up. I have recommended an off-the-shelf medical grade orthotic, patient instructed to remove the /insole from her shoes before applying the device in the shoe. Patient may require a custom AFO bilaterally patient continues to work in her current position. Bilateral lower extremity neuritis: I have ordered an EMG of the bilateral lower extremity, we will discuss the results at follow-up. Patient instructed to continue her current work restrictions until her follow-up with me. Orders Placed This Encounter   Procedures    XR FOOT RIGHT (MIN 3 VIEWS)     Standing Status:   Future     Standing Expiration Date:   4/5/2023     Order Specific Question:   Reason for exam:     Answer:   pain and deformity    XR FOOT LEFT (MIN 3 VIEWS)     Standing Status:   Future     Standing Expiration Date:   4/5/2023     Order Specific Question:   Reason for exam:     Answer:   pain and deformity    EMG     Standing Status:   Future     Standing Expiration Date:   7/4/2022     Order Specific Question:   Which body part?      Answer:   b/l lower extremity       All questions were answered to the patient's satisfaction. Thank you for allowing me to participate in the care of your patient. Follow up:  Return for EMG Internal.    Marc Doshi DPM      Total time spent in patient care: On this date, 4/5/22, I have spent 40 minutes reviewing previous notes, test results and face to face with the patient discussing the diagnosis and importance of compliance with the treatment plan as well as documenting on the day of the visit. Please note that this report has been partially produced using speech recognition software which may cause errors including grammar, punctuation, and spelling or words and phrases that may seem inappropriate. If there are questions or concerns please feel free to contact me for clarification.

## 2022-04-06 ENCOUNTER — OFFICE VISIT (OUTPATIENT)
Dept: PAIN MANAGEMENT | Age: 52
End: 2022-04-06
Payer: COMMERCIAL

## 2022-04-06 DIAGNOSIS — M79.671 PAIN IN BOTH FEET: Primary | ICD-10-CM

## 2022-04-06 DIAGNOSIS — M79.672 PAIN IN BOTH FEET: Primary | ICD-10-CM

## 2022-04-06 PROCEDURE — 95911 NRV CNDJ TEST 9-10 STUDIES: CPT | Performed by: PHYSICAL MEDICINE & REHABILITATION

## 2022-04-06 PROCEDURE — 95886 MUSC TEST DONE W/N TEST COMP: CPT | Performed by: PHYSICAL MEDICINE & REHABILITATION

## 2022-04-06 NOTE — PROGRESS NOTES
Electromyography (EMG)/Nerve conduction studies (NCS) Report: Lower Extremity    Name: Vikki Reardon   YOB: 1970  Date of Service: 4/6/2022   Provider: Alfredo Mercado MD        INDICATIONS:  Vikki Reardon is a 46 y.o. female who presents for electrodiagnostic evaluation for bilateral foot pain by request of Dr. Leena Chaney. Both limbs are necessary to examine in order to evaluate for any evidence of systemic disease as well as establish normal baseline values from which to compare any abnormal unilateral findings. The study is explained and verbal consent to proceed is obtained. NERVE CONDUCTION STUDIES:    Sensory nerve conduction studies: Bilateral sural and superficial peroneal sensory nerve conduction studies demonstrate normal peak latencies and amplitudes. Bilateral lower limb temperatures are normal.     Motor nerve conduction studies: Bilateral peroneal motor nerve conduction studies with pickup over the extensor digitorum brevis demonstrate normal distal latencies and amplitudes. Bilateral tibial motor nerve conduction studies with pickup over the abductor hallucis demonstrate normal distal latencies and amplitudes. H reflex: Bilateral H reflexes are symmetric and not prolonged, waveforms are limited bilaterally. ELECTROMYOGRAPHY: A disposable monopolar needle is used to evaluate bilateral vastus medialis, tibialis anterior, extensor hallucis longus, flexor digitorum longus, peroneus longus, medial gastrocnemius, and lateral gastrocnemius. All of the muscles sampled are free of any increased insertional activity or any abnormal spontaneous activity. Motor unit recruitment is unremarkable. Bilateral low lumbar paraspinal muscle sampling is free of any increased insertional activity or any abnormal spontaneous activity.     SUMMARY:  This study is normal. There is no current electrodiagnostic evidence for an active bilateral lumbosacral motor radiculopathy or generalized large fiber sensorimotor peripheral polyneuropathy. RECOMMENDATIONS: Today's study does not explain the patient's bilateral foot pain. The patient should follow up with Dr. Al Stark as previously instructed. If her symptoms persist or worsen, further electrodiagnostic evaluation may be considered if the patient is agreeable. Clinical correlation is recommended.

## 2022-04-12 ENCOUNTER — TELEPHONE (OUTPATIENT)
Dept: PODIATRY | Age: 52
End: 2022-04-12

## 2022-04-12 ENCOUNTER — OFFICE VISIT (OUTPATIENT)
Dept: PODIATRY | Age: 52
End: 2022-04-12
Payer: COMMERCIAL

## 2022-04-12 VITALS
HEIGHT: 67 IN | WEIGHT: 150 LBS | DIASTOLIC BLOOD PRESSURE: 68 MMHG | SYSTOLIC BLOOD PRESSURE: 118 MMHG | TEMPERATURE: 97.4 F | BODY MASS INDEX: 23.54 KG/M2

## 2022-04-12 DIAGNOSIS — M79.672 PAIN IN BOTH FEET: Primary | ICD-10-CM

## 2022-04-12 DIAGNOSIS — M20.11 HALLUX ABDUCTO VALGUS, BILATERAL: ICD-10-CM

## 2022-04-12 DIAGNOSIS — G57.92 NEURITIS OF LEFT FOOT: ICD-10-CM

## 2022-04-12 DIAGNOSIS — G57.91 NEURITIS OF RIGHT FOOT: ICD-10-CM

## 2022-04-12 DIAGNOSIS — M19.071 ARTHRITIS OF BOTH FEET: ICD-10-CM

## 2022-04-12 DIAGNOSIS — M20.12 HALLUX ABDUCTO VALGUS, BILATERAL: ICD-10-CM

## 2022-04-12 DIAGNOSIS — M21.622 TAILOR'S BUNIONETTE, BILATERAL: ICD-10-CM

## 2022-04-12 DIAGNOSIS — M19.072 ARTHRITIS OF BOTH FEET: ICD-10-CM

## 2022-04-12 DIAGNOSIS — M21.621 TAILOR'S BUNIONETTE, BILATERAL: ICD-10-CM

## 2022-04-12 DIAGNOSIS — M79.671 PAIN IN BOTH FEET: Primary | ICD-10-CM

## 2022-04-12 PROCEDURE — 99213 OFFICE O/P EST LOW 20 MIN: CPT | Performed by: PODIATRIST

## 2022-04-12 ASSESSMENT — ENCOUNTER SYMPTOMS
NAUSEA: 0
VOMITING: 0
BACK PAIN: 1
SHORTNESS OF BREATH: 0

## 2022-04-12 NOTE — PROGRESS NOTES
[address] [facility]  Dept: [phone]  Loc: [address] [facility]  (1970)    4/12/22    Ferdinand Delgado is 46 y.o. female who complains today of:    Chief Complaint   Patient presents with    Ankle Pain     both ankles    Foot Pain     both feet       HPI: Patient presents for follow-up for bilateral foot and ankle pain. Patient reports mild improvement in her symptoms. Patient continues to have pain with extended standing and walking. Patient states that she had the previously ordered x-rays and EMG exam performed. Review of Systems   Constitutional: Negative for chills and fever. HENT: Negative for hearing loss. Respiratory: Negative for shortness of breath. Cardiovascular: Negative for chest pain. Gastrointestinal: Negative for nausea and vomiting. Genitourinary: Negative for difficulty urinating. Musculoskeletal: Positive for back pain. Negative for gait problem. Skin: Negative for wound. Neurological: Negative for numbness. Hematological: Does not bruise/bleed easily. Psychiatric/Behavioral: Negative for sleep disturbance. Allergies:  Demerol, Meperidine, and Sulfa antibiotics    Current Outpatient Medications on File Prior to Visit   Medication Sig Dispense Refill    meloxicam (MOBIC) 7.5 MG tablet Take 1 tablet by mouth daily (Patient not taking: Reported on 4/12/2022) 90 tablet 1    DULoxetine (CYMBALTA) 60 MG extended release capsule Take 90 mg by mouth daily        No current facility-administered medications on file prior to visit.        Past Medical History:   Diagnosis Date    Bipolar 1 disorder ([facility] [address] 6/29/2016    Hemangioma 2016    ct abdomen, left lobe    IBS (irritable bowel syndrome)     Liver lesion, right lobe 2007, 2016    stable between 2 readings    Thoracic back pain      Past Surgical History:   Procedure Laterality Date    APPENDECTOMY  6/10/16    laparoscopic Dr Gilford Head COLONOSCOPY  06/27/2016    w/bx     CYST REMOVAL      DENTAL SURGERY      NECK SURGERY      TUBAL LIGATION       Social History     Socioeconomic History    Marital status:      Spouse name: Not on file    Number of children: Not on file    Years of education: Not on file    Highest education level: Not on file   Occupational History    Not on file   Tobacco Use    Smoking status: Current Every Day Smoker     Packs/day: 1.00     Years: 34.00     Pack years: 34.00     Types: Cigarettes    Smokeless tobacco: Never Used   Substance and Sexual Activity    Alcohol use: Yes     Comment: occasionally    Drug use: Yes     Types: Marijuana Norval Remak)    Sexual activity: Not Currently     Partners: Male   Other Topics Concern    Not on file   Social History Narrative    Not on file     Social Determinants of Health     Financial Resource Strain: Low Risk     Difficulty of Paying Living Expenses: Not hard at all   Food Insecurity: No Food Insecurity    Worried About Running Out of Food in the Last Year: Never true    Rachel of Food in the Last Year: Never true   Transportation Needs:     Lack of Transportation (Medical): Not on file    Lack of Transportation (Non-Medical):  Not on file   Physical Activity:     Days of Exercise per Week: Not on file    Minutes of Exercise per Session: Not on file   Stress:     Feeling of Stress : Not on file   Social Connections:     Frequency of Communication with Friends and Family: Not on file    Frequency of Social Gatherings with Friends and Family: Not on file    Attends Restorationist Services: Not on file    Active Member of Clubs or Organizations: Not on file    Attends Club or Organization Meetings: Not on file    Marital Status: Not on file   Intimate Partner Violence:     Fear of Current or Ex-Partner: Not on file   Rhina Diaz Comments: Popliteal lymph nodes are soft and nontender. Skin:     General: Skin is warm and dry. Capillary Refill: Capillary refill takes less than 2 seconds. Comments: No open lesions noted bilaterally. Normal skin turgor noted bilaterally. Focal hyperkeratotic lesions noted to the bilateral foot at the head of the fifth dorsal, at the medial aspect of the bilateral hallux, and the medial aspect of the right heel. Neurological:      Mental Status: She is alert and oriented to person, place, and time. Deep Tendon Reflexes: Babinski sign absent on the right side. Babinski sign absent on the left side. Reflex Scores:       Patellar reflexes are 1+ on the right side and 1+ on the left side. Achilles reflexes are 0 on the right side and 0 on the left side. Comments: No ankle clonus noted bilaterally. Positive Tinel's sign noted with percussion of the right saphenous nerve, superficial peroneal nerve and tibial nerve. Positive Tinel's sign noted with percussion of the left common peroneal nerve, superficial peroneal nerve, deep peroneal nerve, and tibial nerve. Vibratory sensation is intact bilaterally. Assessment:      Diagnosis Orders   1. Pain in both feet  AFO Plastic Molded w/Ankle J   2. Neuritis of right foot     3. Neuritis of left foot     4. Arthritis of both feet  AFO Plastic Molded w/Ankle J   5. Hallux abducto valgus, bilateral     6. Tailor's bunionette, bilateral         Plan:     Bilateral lower extremity primary osteoarthritis: Radiographs reviewed, minimal arthritis noted to the left lower extremity, mild noted to the right rear foot. I have recommended a home exercise plan which was printed and dispensed to the patient, she should perform this daily. Patient instructed to ice any painful areas at the end of her work shift. I have recommended a custom made articulated AFO for the right lower extremity.   Patient will require the device for the lifetime of the device. Device is a necessity as it should help her perform her activities of daily living with decreased pain as well as complete her work shift with decreased pain and improve stability of the right foot. Bilateral foot neuritis: The results of the EMG were reviewed with the patient, there is no apparent nerve damage to the large nerve fibers. Recommend continued use of Cymbalta. I believe the symptoms may be related to the patient's history of fibromyalgia, patient instructed to monitor the character of her symptoms and to report any significant changes. Orders Placed This Encounter   Procedures    AFO Plastic Molded w/Ankle J     Right lower extremity    Length of need: Lifetime of the device    NPI: [id number]     Standing Status:   Future     Standing Expiration Date:   10/12/2022           Follow up:  Return in about 3 months (around 7/12/2022). Darlin Wilkins DPM      Level of medical decision making: low. Please note that this report has been partially produced using speech recognition software which may cause errors including grammar, punctuation, and spelling or words and phrases that may seem inappropriate. If there are questions or concerns please feel free to contact me for clarification.

## 2022-04-12 NOTE — PATIENT INSTRUCTIONS
Patient Education        Foot Arthritis: Exercises  Introduction  Here are some examples of exercises for you to try. The exercises may be suggested for a condition or for rehabilitation. Start each exercise slowly. Ease off the exercises if you start to have pain. You will be told when to start these exercises and which ones will work bestfor you. How to do the exercises  Calf stretch (knees straight)    1. Place a book on the floor a few inches from a wall or countertop, and put the balls of your feet on it. Your heels should be on the floor. The book needs to be thick enough so that you can feel a gentle stretch in your calf. If you are not steady on your feet, hold on to a chair, counter, or wall while you do this stretch. 2. Keep your knees straight, and lean forward until you feel a stretch in your calf. 3. To get more stretch, add another book or use a thicker book, such as a phone book, a dictionary, or an encyclopedia. 4. Hold the stretch for at least 15 to 30 seconds. 5. Repeat 2 to 4 times. Calf stretch (knees bent)    1. Place a book on the floor a few inches from a wall or countertop, and put the balls of your feet on it. Your heels should be on the floor. The book needs to be thick enough so that you can feel a gentle stretch in your calf. If you are not steady on your feet, hold on to a chair, counter, or wall while you do this stretch. 2. Bend your knees, and lean forward until you feel a stretch in your calf. 3. To get more stretch, add another book or use a thicker book, such as a phone book, a dictionary, or an encyclopedia. 4. Hold the stretch for at least 15 to 30 seconds. 5. Repeat 2 to 4 times. Great toe extension stretch    1. Sit in a chair, and put your affected foot across your other knee. 2. Grasp your heel with one hand and then slowly pull your big toe back with your other hand.  Pull your toe back toward your ankle until you feel a stretch along the bottom of your foot.  3. Hold the stretch for at least 15 to 30 seconds. 4. Repeat 2 to 4 times. 5. Switch feet and repeat steps 1 through 4, even if only one foot is sore. Great toe flexion stretch    1. Sit in a chair, and put your affected foot across your other knee. 2. Grasp your heel with one hand and then slowly push your big toe down with your other hand. Push your toe down and away from your ankle until you feel a stretch along the top of your foot. 3. Hold the stretch for at least 15 to 30 seconds. 4. Repeat 2 to 4 times. 5. Switch feet and repeat steps 1 through 4, even if only one foot is sore. Ankle alphabet    1. Sit in a chair with your feet flat on the floor. (You can also do this exercise lying on your back with your affected leg propped up on a pillow). 2. Lift the heel of your sore foot off the floor, and slowly trace the letters of the alphabet. 3. Switch feet and repeat steps 1 through 2, even if only one foot is sore. Resisted ankle dorsiflexion    1. Tie the ends of an exercise band together to form a loop. Attach one end of the loop to a secure object or shut a door on it to hold it in place. (Or you can have someone hold one end of the loop to provide resistance.)  2. While sitting on the floor or in a chair, loop the other end of the band over the top of your affected foot. 3. Keeping your knee and leg straight, slowly flex your foot to pull back on the exercise band, and then slowly relax. 4. Repeat 8 to 12 times. 5. Switch feet and repeat steps 1 through 4, even if only one foot is sore. Towel curl    1. While sitting, place your affected foot on a towel on the floor, and scrunch the towel toward you with your toes. 2. Then use your toes to push the towel away from you. 3. Repeat 8 to 12 times. 4. Switch feet and repeat steps 1 through 3, even if only one foot is sore. Resisted ankle eversion    1. Sit on the floor with your legs straight.   2. Hold both ends of an exercise band and loop the band around the outside of your affected foot. Then press your other foot against the band. 3. Keeping your leg straight, slowly push your affected foot outward against the band and away from your other foot without letting your leg rotate. Then slowly relax. 4. Repeat 8 to 12 times. 5. Switch feet and repeat steps 1 through 4, even if only one foot is sore. Resisted ankle inversion    1. Sit on the floor with your good leg crossed over your other leg. 2. Hold both ends of an exercise band and loop the band around the inside of your affected foot. Then press your other foot against the band. 3. Keeping your legs crossed, slowly push your affected foot against the band so that foot moves away from your other foot. Then slowly relax. 4. Repeat 8 to 12 times. 5. Switch feet and repeat steps 1 through 4, even if only one foot is sore. Follow-up care is a key part of your treatment and safety. Be sure to make and go to all appointments, and call your doctor if you are having problems. It's also a good idea to know your test results and keep alist of the medicines you take. Where can you learn more? Go to https://[employer]Yes[employer]. org and sign in to your [employer] account. Enter K113 in the UnboundID box to learn more about \"Foot Arthritis: Exercises. \"     If you do not have an account, please click on the \"Sign Up Now\" link. Current as of: July 1, 2021               Content Version: 13.2  © 2006-2022 [employer]wise, Incorporated. Care instructions adapted under license by [facility] ([facility]). If you have questions about a medical condition or this instruction, always ask your healthcare professional. [place] [address] any warranty or liability for your use of this information.

## 2022-08-10 ENCOUNTER — OFFICE VISIT (OUTPATIENT)
Dept: FAMILY MEDICINE CLINIC | Age: 52
End: 2022-08-10

## 2022-08-10 VITALS
HEART RATE: 82 BPM | BODY MASS INDEX: 25.27 KG/M2 | SYSTOLIC BLOOD PRESSURE: 124 MMHG | WEIGHT: 161 LBS | HEIGHT: 67 IN | OXYGEN SATURATION: 94 % | DIASTOLIC BLOOD PRESSURE: 72 MMHG

## 2022-08-10 DIAGNOSIS — M54.50 ACUTE MIDLINE LOW BACK PAIN WITHOUT SCIATICA: Primary | ICD-10-CM

## 2022-08-10 PROCEDURE — 99213 OFFICE O/P EST LOW 20 MIN: CPT | Performed by: NURSE PRACTITIONER

## 2022-08-10 RX ORDER — DULOXETIN HYDROCHLORIDE 30 MG/1
CAPSULE, DELAYED RELEASE ORAL
COMMUNITY
Start: 2022-08-04

## 2022-08-10 RX ORDER — CYCLOBENZAPRINE HCL 10 MG
10 TABLET ORAL 3 TIMES DAILY PRN
Qty: 30 TABLET | Refills: 0 | Status: SHIPPED | OUTPATIENT
Start: 2022-08-10 | End: 2022-08-20

## 2022-08-10 RX ORDER — METHYLPREDNISOLONE 4 MG/1
TABLET ORAL
Qty: 21 TABLET | Refills: 0 | Status: SHIPPED | OUTPATIENT
Start: 2022-08-10 | End: 2022-08-16

## 2022-08-10 ASSESSMENT — ENCOUNTER SYMPTOMS
BACK PAIN: 1
COUGH: 0
SHORTNESS OF BREATH: 0

## 2022-08-10 NOTE — PROGRESS NOTES
Subjective  Chief Complaint   Patient presents with    Back Pain     Pt states back and hip pain since yesterday morning, shooting pain. Back Pain  This is a new problem. The current episode started yesterday. The pain is present in the thoracic spine. The quality of the pain is described as shooting. The pain does not radiate. The pain is severe. The symptoms are aggravated by bending. Pertinent negatives include no numbness, paresthesias, tingling or weakness. She has tried NSAIDs, ice, heat and home exercises (ibuprofen) for the symptoms. The treatment provided no relief.      Patient Active Problem List    Diagnosis Date Noted    Thoracic back pain     Bipolar 1 disorder ([facility] 06/29/2016    Liver lesion, right lobe     Hemangioma 01/01/2016    Subscapularis tendinitis 03/21/2013    Chest pain 03/21/2013     Past Medical History:   Diagnosis Date    Bipolar 1 disorder ([facility] 6/29/2016    Hemangioma 2016    ct abdomen, left lobe    IBS (irritable bowel syndrome)     Liver lesion, right lobe 2007, 2016    stable between 2 readings    Thoracic back pain      Past Surgical History:   Procedure Laterality Date    APPENDECTOMY  6/10/16    laparoscopic Dr Prasad [place]  06/27/2016    w/bx     CYST REMOVAL      DENTAL SURGERY      NECK SURGERY      TUBAL LIGATION       Family History   Problem Relation Age of Onset    Breast Cancer Paternal Aunt      Social History     Socioeconomic History    Marital status:      Spouse name: None    Number of children: None    Years of education: None    Highest education level: None   Tobacco Use    Smoking status: Every Day     Packs/day: 1.00     Years: 34.00     Pack years: 34.00     Types: Cigarettes    Smokeless tobacco: Never   Substance and Sexual Activity    Alcohol use: Yes     Comment: occasionally    Drug use: Yes     Types: Marijuana Baldomero Hendricks)    Sexual activity: Not Currently     Partners: Male     Social Determinants of Health     Financial Resource Strain: Low Risk     Difficulty of Paying Living Expenses: Not hard at all   Food Insecurity: No Food Insecurity    Worried About Running Out of Food in the Last Year: Never true    Ran Out of Food in the Last Year: Never true     Current Outpatient Medications on File Prior to Visit   Medication Sig Dispense Refill    DULoxetine (CYMBALTA) 30 MG extended release capsule TAKE ONE CAPSULE BY MOUTH EVERY MORNING      DULoxetine (CYMBALTA) 60 MG extended release capsule Take 90 mg by mouth daily       meloxicam (MOBIC) 7.5 MG tablet Take 1 tablet by mouth daily (Patient not taking: No sig reported) 90 tablet 1     No current facility-administered medications on file prior to visit. Allergies   Allergen Reactions    Demerol     Meperidine Itching    Sulfa Antibiotics        Review of Systems   Respiratory:  Negative for cough and shortness of breath. Musculoskeletal:  Positive for back pain and gait problem. Neurological:  Negative for tingling, weakness, numbness and paresthesias. Objective  Vitals:    08/10/22 1055   BP: 124/72   Pulse: 82   SpO2: 94%   Weight: 161 lb (73 kg)   Height: 5' 7\" (1.702 m)     Physical Exam  Vitals and nursing note reviewed. Constitutional:       Appearance: Normal appearance. Eyes:      Pupils: Pupils are equal, round, and reactive to light. Cardiovascular:      Rate and Rhythm: Normal rate. Pulmonary:      Effort: Pulmonary effort is normal.   Musculoskeletal:         General: Tenderness present. Cervical back: Neck supple. Lumbar back: Spasms and tenderness present. Skin:     General: Skin is warm. Neurological:      General: No focal deficit present. Mental Status: She is alert and oriented to person, place, and time. Mental status is at baseline. Psychiatric:         Mood and Affect: Mood normal.         Behavior: Behavior normal.         Thought Content:  Thought content normal.         Judgment: Judgment normal.       Assessment & Plan     Diagnosis Orders   1. Acute midline low back pain without sciatica  methylPREDNISolone (MEDROL DOSEPACK) 4 MG tablet    cyclobenzaprine (FLEXERIL) 10 MG tablet          No orders of the defined types were placed in this encounter. Orders Placed This Encounter   Medications    methylPREDNISolone (MEDROL DOSEPACK) 4 MG tablet     Sig: Take by mouth. Dispense:  21 tablet     Refill:  0    cyclobenzaprine (FLEXERIL) 10 MG tablet     Sig: Take 1 tablet by mouth 3 times daily as needed for Muscle spasms     Dispense:  30 tablet     Refill:  0     Side effects, adverse effects of the medication prescribed today, as well as treatment plan/ rationale and result expectations have been discussed with the patient who expresses understanding and desires to proceed. Close follow up to evaluate treatment results and for coordination of care. I have reviewed the patient's medical history in detail and updated the computerized patient record. As always, patient is advised that if symptoms worsen in any way they will proceed to the nearest emergency room. VICKY prn.      Lalito Pond, APRN - CNP

## 2022-09-20 ENCOUNTER — OFFICE VISIT (OUTPATIENT)
Dept: PODIATRY | Age: 52
End: 2022-09-20

## 2022-09-20 VITALS — TEMPERATURE: 97 F | HEIGHT: 67 IN | BODY MASS INDEX: 26.68 KG/M2 | WEIGHT: 170 LBS

## 2022-09-20 DIAGNOSIS — M76.821 POSTERIOR TIBIAL TENDON DYSFUNCTION (PTTD) OF RIGHT LOWER EXTREMITY: ICD-10-CM

## 2022-09-20 DIAGNOSIS — M19.072 ARTHRITIS OF BOTH FEET: ICD-10-CM

## 2022-09-20 DIAGNOSIS — G57.92 NEURITIS OF LEFT FOOT: ICD-10-CM

## 2022-09-20 DIAGNOSIS — M20.12 HALLUX ABDUCTO VALGUS, BILATERAL: ICD-10-CM

## 2022-09-20 DIAGNOSIS — G57.91 NEURITIS OF RIGHT FOOT: ICD-10-CM

## 2022-09-20 DIAGNOSIS — M19.071 ARTHRITIS OF BOTH FEET: ICD-10-CM

## 2022-09-20 DIAGNOSIS — M21.622 TAILOR'S BUNIONETTE, BILATERAL: ICD-10-CM

## 2022-09-20 DIAGNOSIS — M20.11 HALLUX ABDUCTO VALGUS, BILATERAL: ICD-10-CM

## 2022-09-20 DIAGNOSIS — M21.621 TAILOR'S BUNIONETTE, BILATERAL: ICD-10-CM

## 2022-09-20 DIAGNOSIS — M65.871 OTHER SYNOVITIS AND TENOSYNOVITIS, RIGHT ANKLE AND FOOT: ICD-10-CM

## 2022-09-20 DIAGNOSIS — M79.672 PAIN IN BOTH FEET: Primary | ICD-10-CM

## 2022-09-20 DIAGNOSIS — M79.671 PAIN IN BOTH FEET: Primary | ICD-10-CM

## 2022-09-20 PROCEDURE — 99213 OFFICE O/P EST LOW 20 MIN: CPT | Performed by: PODIATRIST

## 2022-09-20 ASSESSMENT — ENCOUNTER SYMPTOMS
BACK PAIN: 0
SHORTNESS OF BREATH: 0
NAUSEA: 0
VOMITING: 0

## 2022-09-20 NOTE — PROGRESS NOTES
[address] [facility]  Dept: [phone]  Loc: [address] [facility]  (1970)    9/20/22    Ferdinand Ness is 46 y.o. female who complains today of:    Chief Complaint   Patient presents with    Foot Pain     FILEMON feet       HPI: Patient presents for follow-up for bilateral foot pain, the right is worse than the left. Patient denies improvement since her last visit. Patient states that she did receive the AFO, she has not been wearing it at all times and walking. Patient states she is no longer working at [address] [place] [address] [employer] and felt she did not need to wear it as often. Patient reports compliance with home exercise plan. Patient reports continued neuritic pain bilaterally. She reports minimal relief with Cymbalta. Review of Systems   Constitutional:  Negative for chills and fever. HENT:  Negative for hearing loss. Respiratory:  Negative for shortness of breath. Cardiovascular:  Negative for chest pain. Gastrointestinal:  Negative for nausea and vomiting. Genitourinary:  Negative for difficulty urinating. Musculoskeletal:  Positive for gait problem. Negative for back pain. Skin:  Negative for wound. Neurological:  Positive for numbness. Hematological:  Does not bruise/bleed easily. Psychiatric/Behavioral:  Negative for sleep disturbance. Allergies:  Demerol, Meperidine, and Sulfa antibiotics    Current Outpatient Medications on File Prior to Visit   Medication Sig Dispense Refill    DULoxetine (CYMBALTA) 30 MG extended release capsule TAKE ONE CAPSULE BY MOUTH EVERY MORNING      DULoxetine (CYMBALTA) 60 MG extended release capsule Take 90 mg by mouth daily        No current facility-administered medications on file prior to visit.        Past Medical History:   Diagnosis Date    Bipolar 1 disorder ([facility] [address] 6/29/2016    Hemangioma 2016 ct abdomen, left lobe    IBS (irritable bowel syndrome)     Liver lesion, right lobe 2007, 2016    stable between 2 readings    Thoracic back pain      Past Surgical History:   Procedure Laterality Date    APPENDECTOMY  6/10/16    laparoscopic Dr Farshad Pena  06/27/2016    w/bx     CYST REMOVAL      DENTAL SURGERY      NECK SURGERY      TUBAL LIGATION       Social History     Socioeconomic History    Marital status:      Spouse name: Not on file    Number of children: Not on file    Years of education: Not on file    Highest education level: Not on file   Occupational History    Not on file   Tobacco Use    Smoking status: Every Day     Packs/day: 1.00     Years: 34.00     Pack years: 34.00     Types: Cigarettes    Smokeless tobacco: Never   Substance and Sexual Activity    Alcohol use: Yes     Comment: occasionally    Drug use: Yes     Types: Marijuana Hulon Polanco)    Sexual activity: Not Currently     Partners: Male   Other Topics Concern    Not on file   Social History Narrative    Not on file     Social Determinants of Health     Financial Resource Strain: Low Risk     Difficulty of Paying Living Expenses: Not hard at all   Food Insecurity: No Food Insecurity    Worried About Running Out of Food in the Last Year: Never true    Ran Out of Food in the Last Year: Never true   Transportation Needs: Not on file   Physical Activity: Not on file   Stress: Not on file   Social Connections: Not on file   Intimate Partner Violence: Not on file   Housing Stability: Not on file     Family History   Problem Relation Age of Onset    Breast Cancer Paternal Aunt            Objective:   Vitals:  Temp 97 °F (36.1 °C)   Ht 5' 7\" (1.702 m)   Wt 170 lb (77.1 kg)   LMP 07/14/2020   BMI 26.63 kg/m² Pain Score:  10 - Worst pain ever      Physical Exam  Vitals reviewed.    Cardiovascular:      Pulses:           Dorsalis pedis pulses are 2+ on the right side and 2+ on the left bilaterally. Normal skin turgor noted bilaterally. Focal hyperkeratotic lesions noted to the bilateral foot at the head of the fifth dorsal, at the medial aspect of the bilateral hallux, and the medial aspect of the right heel. Neurological:      Mental Status: She is alert and oriented to person, place, and time. Deep Tendon Reflexes: Babinski sign absent on the right side. Babinski sign absent on the left side. Reflex Scores:       Patellar reflexes are 1+ on the right side and 1+ on the left side. Achilles reflexes are 0 on the right side and 0 on the left side. Comments: No ankle clonus noted bilaterally. Positive Tinel's sign noted with percussion of the right saphenous nerve, superficial peroneal nerve and tibial nerve. Positive Tinel's sign noted with percussion of the left common peroneal nerve, superficial peroneal nerve, deep peroneal nerve, and tibial nerve. Vibratory sensation is intact bilaterally. Assessment:      Diagnosis Orders   1. Pain in both [address] Sanchez MD, Pain Management, [place]      2. Posterior tibial tendon dysfunction (PTTD) of right lower extremity        3. Other synovitis and tenosynovitis, right ankle and foot        4. Neuritis of right foot  Deepali Malave MD, Pain Management, [place]      5. Neuritis of left foot  Deepali Malave MD, Pain Management, [place]      6. Arthritis of both feet        7. Hallux abducto valgus, bilateral        8. Tailor's bunionette, bilateral            Plan:     PTTD right lower extremity: Patient instructed to wear the AFO at all times and walking to the right lower extremity. Patient should wear a supportive shoe to the other foot. Patient instructed continue home exercise plan. Patient instructed to ice the painful area after activity. I have also prescribed topical diclofenac, apply up to 4 times per day to the painful area.   Patient instructed to monitor for decreased medial longitudinal arch height or increased pain at the tendon insertion and call immediately if this occurs. We will consider an MRI if the pain does not improve. Bilateral neuritic pain/history of fibromyalgia: Patient referred to pain management for assessment. Orders Placed This Encounter   Medications    diclofenac sodium (VOLTAREN) 1 % GEL     Sig: Apply 4 g topically 4 times daily     Dispense:  100 g     Refill:  5       Orders Placed This Encounter   Procedures    Monica Patricio MD, Pain Management, [place]     Referral Priority:   Routine     Referral Type:   Eval and Treat     Referral Reason:   Specialty Services Required     Referred to Provider:   Marlon Delgado MD     Requested Specialty:   Physical Medicine and Rehab     Number of Visits Requested:   1           Follow up:  Return in about 3 months (around 12/20/2022). Lala Bernardo DPM      Level of medical decision making: low. Please note that this report has been partially produced using speech recognition software which may cause errors including grammar, punctuation, and spelling or words and phrases that may seem inappropriate. If there are questions or concerns please feel free to contact me for clarification.

## 2022-11-03 ENCOUNTER — OFFICE VISIT (OUTPATIENT)
Dept: FAMILY MEDICINE CLINIC | Age: 52
End: 2022-11-03

## 2022-11-03 VITALS
SYSTOLIC BLOOD PRESSURE: 130 MMHG | OXYGEN SATURATION: 96 % | HEIGHT: 67 IN | WEIGHT: 163 LBS | HEART RATE: 84 BPM | DIASTOLIC BLOOD PRESSURE: 74 MMHG | BODY MASS INDEX: 25.58 KG/M2

## 2022-11-03 DIAGNOSIS — M17.0 OSTEOARTHRITIS OF BOTH LOWER LEGS: ICD-10-CM

## 2022-11-03 DIAGNOSIS — M79.672 PAIN IN BOTH FEET: ICD-10-CM

## 2022-11-03 DIAGNOSIS — M25.50 POLYARTHRALGIA: Primary | ICD-10-CM

## 2022-11-03 DIAGNOSIS — M79.2 NEURITIS: ICD-10-CM

## 2022-11-03 DIAGNOSIS — M79.671 PAIN IN BOTH FEET: ICD-10-CM

## 2022-11-03 PROCEDURE — 99214 OFFICE O/P EST MOD 30 MIN: CPT | Performed by: NURSE PRACTITIONER

## 2022-11-03 RX ORDER — GABAPENTIN 300 MG/1
CAPSULE ORAL
COMMUNITY
Start: 2022-09-27

## 2022-11-03 SDOH — ECONOMIC STABILITY: FOOD INSECURITY: WITHIN THE PAST 12 MONTHS, THE FOOD YOU BOUGHT JUST DIDN'T LAST AND YOU DIDN'T HAVE MONEY TO GET MORE.: NEVER TRUE

## 2022-11-03 SDOH — ECONOMIC STABILITY: FOOD INSECURITY: WITHIN THE PAST 12 MONTHS, YOU WORRIED THAT YOUR FOOD WOULD RUN OUT BEFORE YOU GOT MONEY TO BUY MORE.: NEVER TRUE

## 2022-11-03 ASSESSMENT — ENCOUNTER SYMPTOMS
TROUBLE SWALLOWING: 0
SHORTNESS OF BREATH: 0
CHEST TIGHTNESS: 0
COLOR CHANGE: 0
BACK PAIN: 0
DIARRHEA: 0
ABDOMINAL PAIN: 0
CONSTIPATION: 0
EYE PAIN: 0
COUGH: 0

## 2022-11-03 ASSESSMENT — SOCIAL DETERMINANTS OF HEALTH (SDOH): HOW HARD IS IT FOR YOU TO PAY FOR THE VERY BASICS LIKE FOOD, HOUSING, MEDICAL CARE, AND HEATING?: NOT HARD AT ALL

## 2022-11-03 NOTE — PROGRESS NOTES
Subjective  Chief Complaint   Patient presents with    Joint Pain     Pt states she sees a specialist for her pain but she needs to discuss paperwork for disability through her PCP    Health Maintenance     Pt declined flu vax       Joint Pain   Pertinent negatives include no fever. Pt following with podiatry for ankle/foot pain. Pt lost her job d/t IBS flare up which she related to the pain she was having. Pt is now wearing braces on feet/ankles, does not feel it helps that much but is preventing further damage. She states Dr. Mary Gifford wanted to do an MRI but she couldn't because she didn't have insurance. Pt now struggling financially d/t not being able to work because of the pain and instability in her ankles and feet. Pt has f/u with podiatry in December. Was referred to pain management by Dr. Mary Gifford but she did not show to appointment in September.       Past Medical History:   Diagnosis Date    Bipolar 1 disorder ([facility] [address] 6/29/2016    Hemangioma 2016    ct abdomen, left lobe    IBS (irritable bowel syndrome)     Liver lesion, right lobe 2007, 2016    stable between 2 readings    Thoracic back pain      Patient Active Problem List    Diagnosis Date Noted    Thoracic back pain     Bipolar 1 disorder ([facility] [address] 06/29/2016    Liver lesion, right lobe     Hemangioma 01/01/2016    Subscapularis tendinitis 03/21/2013    Chest pain 03/21/2013     Past Surgical History:   Procedure Laterality Date    APPENDECTOMY  6/10/16    laparoscopic Dr Aldrich Foot  06/27/2016    w/bx     CYST REMOVAL      DENTAL SURGERY      NECK SURGERY      TUBAL LIGATION       Family History   Problem Relation Age of Onset    Breast Cancer Paternal Aunt      Social History     Socioeconomic History    Marital status:      Spouse name: None    Number of children: None    Years of education: None    Highest education level: None   Tobacco Use    Smoking status: Every Day Packs/day: 1.00     Years: 34.00     Pack years: 34.00     Types: Cigarettes    Smokeless tobacco: Never   Substance and Sexual Activity    Alcohol use: Yes     Comment: occasionally    Drug use: Yes     Types: Marijuana Joaquin Weiner)    Sexual activity: Not Currently     Partners: Male     Social Determinants of Health     Financial Resource Strain: Low Risk     Difficulty of Paying Living Expenses: Not hard at all   Food Insecurity: No Food Insecurity    Worried About Running Out of Food in the Last Year: Never true    Ran Out of Food in the Last Year: Never true     Current Outpatient Medications on File Prior to Visit   Medication Sig Dispense Refill    gabapentin (NEURONTIN) 300 MG capsule TAKE ONE CAPSULE BY MOUTH AT BEDTIME      diclofenac sodium (VOLTAREN) 1 % GEL Apply 4 g topically 4 times daily 100 g 5    DULoxetine (CYMBALTA) 30 MG extended release capsule TAKE ONE CAPSULE BY MOUTH EVERY MORNING      DULoxetine (CYMBALTA) 60 MG extended release capsule Take 90 mg by mouth daily        No current facility-administered medications on file prior to visit. Allergies   Allergen Reactions    Demerol     Meperidine Itching    Sulfa Antibiotics        Review of Systems   Constitutional:  Negative for activity change, appetite change, chills, diaphoresis, fatigue and fever. HENT:  Negative for congestion, ear pain, hearing loss and trouble swallowing. Eyes:  Negative for pain and visual disturbance. Respiratory:  Negative for cough, chest tightness and shortness of breath. Cardiovascular:  Negative for chest pain, palpitations and leg swelling. Gastrointestinal:  Negative for abdominal pain, constipation and diarrhea. Endocrine: Negative for polydipsia, polyphagia and polyuria. Genitourinary:  Negative for difficulty urinating and dysuria. Musculoskeletal:  Positive for arthralgias and myalgias. Negative for back pain. Skin:  Negative for color change and rash.    Neurological:  Negative for dizziness and light-headedness. Psychiatric/Behavioral:  Negative for dysphoric mood. The patient is not nervous/anxious. Objective  Vitals:    11/03/22 1305   BP: 130/74   Site: Left Upper Arm   Position: Sitting   Cuff Size: Medium Adult   Pulse: 84   SpO2: 96%   Weight: 163 lb (73.9 kg)   Height: 5' 7\" (1.702 m)     Physical Exam  Constitutional:       General: She is not in acute distress. Appearance: Normal appearance. She is normal weight. She is not ill-appearing, toxic-appearing or diaphoretic. HENT:      Head: Normocephalic and atraumatic. Right Ear: External ear normal.      Left Ear: External ear normal.      Nose: Nose normal. No congestion or rhinorrhea. Eyes:      Extraocular Movements: Extraocular movements intact. Conjunctiva/sclera: Conjunctivae normal.      Pupils: Pupils are equal, round, and reactive to light. Cardiovascular:      Rate and Rhythm: Normal rate and regular rhythm. Pulses: Normal pulses. Heart sounds: Normal heart sounds. No murmur heard. Pulmonary:      Effort: Pulmonary effort is normal. No respiratory distress. Breath sounds: Normal breath sounds. No stridor. No wheezing, rhonchi or rales. Chest:      Chest wall: No tenderness. Musculoskeletal:         General: Normal range of motion. Cervical back: Normal range of motion and neck supple. No tenderness. Right lower leg: No edema. Left lower leg: No edema. Lymphadenopathy:      Cervical: No cervical adenopathy. Skin:     General: Skin is warm. Capillary Refill: Capillary refill takes less than 2 seconds. Coloration: Skin is not jaundiced. Findings: No erythema or lesion. Neurological:      General: No focal deficit present. Mental Status: She is alert and oriented to person, place, and time. Mental status is at baseline. Cranial Nerves: No cranial nerve deficit.       Coordination: Coordination normal.      Gait: Gait normal.   Psychiatric: Mood and Affect: Mood normal.         Behavior: Behavior normal.         Thought Content: Thought content normal.         Judgment: Judgment normal.       Assessment& Plan     Diagnosis Orders   1. Polyarthralgia        2. Pain in both feet        3. Osteoarthritis of both lower legs        4. Neuritis          Pt encouraged to follow up with podiatry. Pt also advised to schedule with pain management. Discussed with pt that I do not do and will not make a recommendation in regards to long term disability. Pt will need to follow up disability/SS office if she wishes to pursue disability and she will need functional capacity testing and evaluation by them. No orders of the defined types were placed in this encounter. No orders of the defined types were placed in this encounter. There are no discontinued medications. Return if symptoms worsen or fail to improve.     Oliver Moss, LUZ - CNP

## 2022-12-05 ENCOUNTER — INITIAL CONSULT (OUTPATIENT)
Dept: PAIN MANAGEMENT | Age: 52
End: 2022-12-05

## 2022-12-05 VITALS
HEIGHT: 67 IN | DIASTOLIC BLOOD PRESSURE: 74 MMHG | SYSTOLIC BLOOD PRESSURE: 124 MMHG | WEIGHT: 160 LBS | BODY MASS INDEX: 25.11 KG/M2 | TEMPERATURE: 98.4 F

## 2022-12-05 DIAGNOSIS — G89.29 CHRONIC PAIN OF BOTH ANKLES: Primary | ICD-10-CM

## 2022-12-05 DIAGNOSIS — M25.572 CHRONIC PAIN OF BOTH ANKLES: Primary | ICD-10-CM

## 2022-12-05 DIAGNOSIS — M25.571 CHRONIC PAIN OF BOTH ANKLES: Primary | ICD-10-CM

## 2022-12-05 PROCEDURE — 99203 OFFICE O/P NEW LOW 30 MIN: CPT | Performed by: PHYSICAL MEDICINE & REHABILITATION

## 2022-12-05 RX ORDER — LIDOCAINE 40 MG/G
CREAM TOPICAL
Qty: 45 G | Refills: 1 | Status: SHIPPED | OUTPATIENT
Start: 2022-12-05

## 2022-12-05 RX ORDER — TIZANIDINE 2 MG/1
2 TABLET ORAL EVERY EVENING
Qty: 7 TABLET | Refills: 0 | Status: SHIPPED | OUTPATIENT
Start: 2022-12-05 | End: 2022-12-12

## 2022-12-05 RX ORDER — MELOXICAM 7.5 MG/1
7.5 TABLET ORAL DAILY
Qty: 7 TABLET | Refills: 0 | Status: SHIPPED | OUTPATIENT
Start: 2022-12-05 | End: 2022-12-12

## 2022-12-05 ASSESSMENT — ENCOUNTER SYMPTOMS
CONSTIPATION: 0
BACK PAIN: 0
SHORTNESS OF BREATH: 0
NAUSEA: 0
DIARRHEA: 0

## 2022-12-05 NOTE — PROGRESS NOTES
Vianey Quezada  (1970)    12/5/2022    Subjective:     Vianey Quezada is 46 y.o. female who complains today of:    Chief Complaint   Patient presents with    Foot Pain     bilateral    Ankle Pain     bilateral       Vianey Quezada is a 46 y.o. female who presents for evaluation by request of Dr. Gretchen Tadeo for pain management. She has struggled with pain for over 10 months. She denies any immediately-preceding traumatic or inciting events. She has been previously evaluated by Dr. Tushar Carbajal whose records are reviewed below. She describes pain located both ankles, right worse than left. Pain is a constant ache and is currently a 9/10 and gets up to a 10/10 at its worst and goes down to a 8/10 at its best. Pain is worse with bending and walking. Pain is better with very little. Pain is located 70% on the right and 30% on the left. Pain is located 10% in the back and 90% in the ankles. She denies any numbness, tingling, weakness, bowel or bladder dysfunction, saddle anesthesia, falls, history of cancer, unexplained weight loss, persistent night pain and sweats, fever, IV drug abuse, immunocompromise, chronic prednisone or antibiotic use, or any other red flag symptoms. Mood is down, denies any suicidal or homicidal ideation. Sleep is poor, awakes fatigued.     She has tried:  Home exercise program with minimal relief    Diagnostic testing previously performed includes XRs and EMG    Medications tried include:  Acetaminophen with minimal relief for over 3 months  Ibuprofen with minimal relief for over 3 months  Cymbalta Duloxetine 30 mg  Cyclobenzaprine 10 mg doesn't recall  Medrol dose pack not picked up due to expense    Allergies, Medications, Past Medical History, Family History, Social History, Work History, and Review of Systems reviewed below     +Irritable bowel syndrome   +Depression and Anxiety on Cymbalta 30 mg from [facility]    No Seizures, Epilepsy or Brain Surgery     Spends her time: \"I don't have any money, I'm not gonna lie. \"  Used to be a  in a school district, left to sell insurance. She had Covid Nov 2021, \"every since then everything physically has gone downhill. \" She has always been a physical . She was always busy raising her four children, helping them with sports. Her children are all doing well. Filed for disability.      Allergies:  Demerol, Meperidine, and Sulfa antibiotics    Past Medical History:   Diagnosis Date    Bipolar 1 disorder ([address] 6/29/2016    Hemangioma 2016    ct abdomen, left lobe    IBS (irritable bowel syndrome)     Liver lesion, right lobe 2007, 2016    stable between 2 readings    Thoracic back pain      Past Surgical History:   Procedure Laterality Date    APPENDECTOMY  6/10/16    laparoscopic Dr Iris Hernandez  06/27/2016    w/bx     CYST REMOVAL      DENTAL SURGERY      NECK SURGERY      TUBAL LIGATION       Family History   Problem Relation Age of Onset    Breast Cancer Paternal Aunt      Social History     Socioeconomic History    Marital status:      Spouse name: Not on file    Number of children: Not on file    Years of education: Not on file    Highest education level: Not on file   Occupational History    Not on file   Tobacco Use    Smoking status: Every Day     Packs/day: 1.00     Years: 34.00     Pack years: 34.00     Types: Cigarettes    Smokeless tobacco: Never   Substance and Sexual Activity    Alcohol use: Yes     Comment: occasionally    Drug use: Yes     Types: Marijuana Sherine Hikes)    Sexual activity: Not Currently     Partners: Male   Other Topics Concern    Not on file   Social History Narrative    Not on file     Social Determinants of Health     Financial Resource Strain: Low Risk     Difficulty of Paying Living Expenses: Not hard at all   Food Insecurity: No Food Insecurity    Worried About [email or web address] in the Last Year: Never true    [address] in the Last Year: Never true   Transportation Needs: Not on file   Physical Activity: Not on file   Stress: Not on file   Social Connections: Not on file   Intimate Partner Violence: Not on file   Housing Stability: Not on file       Current Outpatient Medications on File Prior to Visit   Medication Sig Dispense Refill    DULoxetine (CYMBALTA) 30 MG extended release capsule TAKE ONE CAPSULE BY MOUTH EVERY MORNING      DULoxetine (CYMBALTA) 60 MG extended release capsule Take 90 mg by mouth daily       gabapentin (NEURONTIN) 300 MG capsule TAKE ONE CAPSULE BY MOUTH AT BEDTIME (Patient not taking: Reported on 12/5/2022)      diclofenac sodium (VOLTAREN) 1 % GEL Apply 4 g topically 4 times daily (Patient not taking: Reported on 12/5/2022) 100 g 5     No current facility-administered medications on file prior to visit. Review of Systems   Constitutional:  Negative for fever. HENT:  Negative for hearing loss. Respiratory:  Negative for shortness of breath. Gastrointestinal:  Negative for constipation, diarrhea and nausea. Genitourinary:  Negative for difficulty urinating. Musculoskeletal:  Positive for arthralgias. Negative for back pain and neck pain. Skin:  Negative for rash. Neurological:  Negative for headaches. Hematological:  Does not bruise/bleed easily. Psychiatric/Behavioral:  Negative for sleep disturbance. Objective:     Vitals:  /74   Temp 98.4 °F (36.9 °C)   Ht 5' 7\" (1.702 m)   Wt 160 lb (72.6 kg)   LMP 07/14/2020   BMI 25.06 kg/m² Pain Score:  10 - Worst pain ever      Exam performed under Coronavirus precautions  Gen: No acute distress  Neck: Grossly symmetric without any significant thyromegaly or masses appreciated. Eyes: No scleral icterus or lid lag appreciated bilaterally. Irises without gross defects bilaterally. HEENT: Hearing grossly intact bilaterally. Normocephalic, external ears and visible portions of nose and mouth atraumatic.   Lymph: No gross neck or axillary lymphadenopathy  Cardio: No significant lower extremity edema, pulses intact without significant digit ischemia. Abd: No gross masses or large hernias appreciated. Skin: Visualized skin without any dermatomal rashes or sores. Palpation free of any tightening or subcutaneous nodules. MSK: Gait is antalgic. No significant upper limb digit ischemia appreciated. Psych: Pleasant and cooperative with the history and exam. Mood and Affect normal. Appropriately dressed with good eye contact. Judgement and insight normal. Recent and remote memory intact. Alert and Oriented x3. Neuro: Cranial nerves II-XII grossly intact. No significant pathologic reflexes appreciated. Rises from a seated to standing position with mild difficulty. Gait is antalgic. No assistive devices used. Heel and toe walk intact. Lumbar flexion to 90 degrees, extension to 30 degrees. Limited lumbar spine range of motion. Rotation and extension reproduces axial low back pain. Other facet provocative maneuvers are positive. No gross step offs noted. No tenderness to palpation over the mid to low lumbar spinous processes and bilateral lumbar paraspinals. No tenderness over bilateral PSIS. No tenderness over bilateral greater trochanters. No tenderness over bilateral deep gluteal regions. Sensation grossly intact in both legs. Reflexes and strength functional for ambulation, no abnormal reflexes appreciated on exam today  Strength greater than 3/5 bilateral legs  Straight leg raise negative bilaterally. Right ankle in AFO. Tender right medial aspect of right foot. Some tenderness left medial aspect of left foot. Limited ankle bilateral range of motion. Outside record review:  Review of the original consultation request reveals no specific diagnostic requests or clinical concerns aside from neuritis bilateral feet that require particular attention.  There are no suggested, requested, or specified tests to be ordered or any prior diagnostics performed that require follow-up or further investigation. Dr Betsy Diop 9/20/22: Posterior tibial tendon dysfunction (PTTD) of right lower extremity, AFO. Bilateral neuritic pain history of fibromyalgia, referred to pain management for assessment. EMG B LE 4/6/22: This study is normal. There is no current electrodiagnostic evidence for an active bilateral lumbosacral motor radiculopathy or generalized large fiber sensorimotor peripheral polyneuropathy. XR L Foot 4/5/22: negative left foot  XR R Foot 4/5/22: no acute fracture    Component      Latest Ref Rng & Units 3/17/2022           8:57 AM   Creatinine      0.50 - 0.90 mg/dL 0.48 (L)     Component      Latest Ref Rng & Units 3/17/2022           8:57 AM   Platelet Count      852 - 400 K/uL 206     Component      Latest Ref Rng & Units 5/14/2017           6:15 PM   Amphetamine Screen, Urine      Negative <1000 ng/mL Neg   Barbiturate Screen, Ur      Negative < 200 ng/mL Neg   Benzodiazepine Screen, Urine      Negative < 200 ng/mL Neg   Cannabinoid Scrn, Ur      Negative < 50 ng/mL POSITIVE (A)   Cocaine Metabolite Screen, Urine      Negative < 300 ng/mL Neg   Opiate Scrn, Ur      Negative < 300 ng/mL Neg   PCP Screen, Urine      Negative < 25 ng/mL Neg         Family history of alcohol abuse 0  Family history of illegal drug abuse 0  Family history of prescription drug abuse 0    Personal history of alcohol abuse/DUI +3 history of DUI  Personal history of illegal drug abuse 0  Personal history of prescription drug abuse 0    Age between 17-45 0    History of preadolescent sexual abuse 0    Personal history of obsessive compulsive disorder 0  Personal history of attention deficit disorder +2  Personal history of bipolar disorder 0  Personal history of schizophrenia 0  Personal history of depression +1    Score = 6, moderate risk  Assessment:      Diagnosis Orders   1.  Chronic pain of both ankles  lidocaine (LMX) 4 % cream    [facility] Physical start 12/5/2022. Avoid all other muscle relaxers and/or sedating medicines.  -No opioids recommended at this time given risk score  -Will defer interventions to Podiatry    Controlled Substance Monitoring:    Acute and Chronic Pain Monitoring:   RX Monitoring 12/5/2022   Periodic Controlled Substance Monitoring Assessed functional status. Discussed the risks, side effects, and symptoms that would warrant urgent or emergent physician evaluation of all medications prescribed today. The patient was advised that NSAID-type medications have three important potential side effects: gastrointestinal irritation including hemorrhage, myocardial injury including possible infarction, and kidney injury. She was asked to take the medication with food, avoid any other NSAIDs or steroids, and discontinue if she develops any concerning symptoms. She should immediately stop the medication and proceed to the emergency department for chest pain, dark urine or difficulty with producing urine, vomiting, abdominal pain or black/tarry stools. The patient expresses understanding of these issues and all questions were answered. Provided education and counseling regarding the diagnosis, prognosis, and treatment options. All questions were answered. Encouraged her to follow-up with her primary care physician and/or specialists as required for her overall health and management of her comorbidities as well as any new positive symptoms mentioned in review of systems above. Care was provided within the definitions and limitations of our specialty practice. Encouraged lifestyle interventions including healthy habits, lifestyle changes, regular aerobic exercise and appropriate weight maintenance as advised by their primary care physician or cardiovascular health provider. Discussed well care and disease prevention/maintenance.      All recommendations for therapy are provided to improve function with activities of daily living, decrease pain, and help develop an exercise program. All recommendations for medications are meant to help decrease pain, improve function with activities of daily living, maintain compliance with home exercise program, and improve quality of life. Encouraged compliance with her home exercise program. Recommended compliance with physical therapy program as outlined above. Discussed the elevated risks of excessive sedation while on pain medications. Advised her against driving or operating heavy machinery or performing any activities where she may harm herself or others while on pain medications. Particular caution was emphasized especially during dose adjustments and medication changes. Discussed the elevated risks of respiratory depression and death while on opioid medications, especially when combined with other sedative substances. Discussed the risks of temporary disability, permanent disability, morbidity, and mortality with poorly-managed or undiagnosed medical conditions and comorbidities. Emphasized the importance of timely medical evaluation and treatment as previously recommended by us or other medical professionals. Risks of not pursing these recommendations were emphasized. The patient was offered a treatment at our facility. The physician and patient have discussed in detail the risk of exposure to and/or potential harm posed by the COVID-19 virus with having office visits and procedures at this time versus the risk of delaying the visits and procedures. It is not possible to know either the risk of delaying the visits or procedure or chance of getting an infection with perfect accuracy, but a joint decision was made between the patient and the physician to proceed at this time with the scheduled visits and procedures.     Advised her that any lab testing, imaging, or other diagnostic test results are best discussed in person in the office so that we can provide a clear explanation of their significance and best treatment based upon these results. It is her responsibility to make and keep a follow up appointment to discuss these test results in person to discuss the significance of the findings and appropriate follow-up steps. She expressed complete understanding and agreement with the entire plan as outlined above. Portions of this note may have been typed, auto-populated, dictated or transcribed by voice recognition resulting in errors, omissions, or close substitutions which may be missed despite careful proofreading. Please contact the author for any questions or concerns. Thank you Dr. Afshan Jordan for the opportunity to participate in this patient's care. If you have any questions or concerns, please do not hesitate to contact us. Follow up:  Return in about 2 months (around 2/5/2023) for reassessment of pain and symptoms, P.T. Internal Ref.     Avinash Quinteros MD

## 2022-12-20 ENCOUNTER — OFFICE VISIT (OUTPATIENT)
Dept: PODIATRY | Age: 52
End: 2022-12-20
Payer: MEDICAID

## 2022-12-20 VITALS — WEIGHT: 163 LBS | TEMPERATURE: 97.9 F | BODY MASS INDEX: 25.58 KG/M2 | HEIGHT: 67 IN

## 2022-12-20 DIAGNOSIS — M76.821 POSTERIOR TIBIAL TENDON DYSFUNCTION (PTTD) OF RIGHT LOWER EXTREMITY: ICD-10-CM

## 2022-12-20 DIAGNOSIS — M79.672 LEFT FOOT PAIN: ICD-10-CM

## 2022-12-20 DIAGNOSIS — M79.671 RIGHT FOOT PAIN: Primary | ICD-10-CM

## 2022-12-20 DIAGNOSIS — M65.871 OTHER SYNOVITIS AND TENOSYNOVITIS, RIGHT ANKLE AND FOOT: ICD-10-CM

## 2022-12-20 PROCEDURE — 99213 OFFICE O/P EST LOW 20 MIN: CPT | Performed by: PODIATRIST

## 2022-12-20 ASSESSMENT — ENCOUNTER SYMPTOMS
NAUSEA: 0
BACK PAIN: 1
VOMITING: 0
SHORTNESS OF BREATH: 0

## 2022-12-20 NOTE — PROGRESS NOTES
[address] [facility]  Dept: [phone]  Loc: [phone]       Lorene Thayer  (1970)    12/20/22    Ferdinand Quiles is 46 y.o. female who complains today of:    Chief Complaint   Patient presents with    Foot Pain     Both feet       HPI: Patient presents for follow-up. She complains of continued bilateral lower extremity pain. She states that the right is significantly more painful than the left, she has minimal symptoms to the left. Patient reports compliance with the use of the AFO to the right lower extremity. Patient states that she was evaluated by pain management and will be beginning physical therapy after the holidays. Patient states that the AFO does not seem to decrease her pain but it does improve her stability. Patient points to the navicular at the medial aspect of the right foot as the area of intense symptoms, she states that this radiates to behind the lateral malleolus. Patient also complains of pain radiating across to the lateral foot. Patient has not observed further decrease in medial longitudinal arch height of the right foot. Patient has been wearing a supportive shoe with the device. Review of Systems   Constitutional:  Negative for chills and fever. HENT:  Negative for hearing loss. Respiratory:  Negative for shortness of breath. Cardiovascular:  Negative for chest pain. Gastrointestinal:  Negative for nausea and vomiting. Genitourinary:  Negative for difficulty urinating. Musculoskeletal:  Positive for arthralgias, back pain and gait problem. Skin:  Negative for wound. Neurological:  Positive for numbness. Hematological:  Bruises/bleeds easily. Psychiatric/Behavioral:  Positive for sleep disturbance. The patient is not a diabetic.    PCP/ Endocrinologist: LUZ Luz CNP   Date last seen: 11/3/2022    Allergies:  Demerol, Meperidine, and Sulfa antibiotics    Current Outpatient Medications on File Prior to Visit   Medication Sig Dispense Refill    lidocaine (LMX) 4 % cream Apply a half dollar sized amount to intact skin topically up to twice daily as needed for pain 45 g 1    gabapentin (NEURONTIN) 300 MG capsule       diclofenac sodium (VOLTAREN) 1 % GEL Apply 4 g topically 4 times daily 100 g 5    DULoxetine (CYMBALTA) 30 MG extended release capsule TAKE ONE CAPSULE BY MOUTH EVERY MORNING      DULoxetine (CYMBALTA) 60 MG extended release capsule Take 90 mg by mouth daily       meloxicam (MOBIC) 7.5 MG tablet Take 1 tablet by mouth daily for 7 days Take with food, avoid other NSAIDs (Ibuprofen) and steroids 7 tablet 0     No current facility-administered medications on file prior to visit.        Past Medical History:   Diagnosis Date    Bipolar 1 disorder ([facility] [place] [address] 6/29/2016    Hemangioma 2016    ct abdomen, left lobe    IBS (irritable bowel syndrome)     Liver lesion, right lobe 2007, 2016    stable between 2 readings    Thoracic back pain      Past Surgical History:   Procedure Laterality Date    APPENDECTOMY  6/10/16    laparoscopic Dr Shanon Palmer  06/27/2016    w/bx     CYST REMOVAL      DENTAL SURGERY      NECK SURGERY      TUBAL LIGATION       Social History     Socioeconomic History    Marital status:      Spouse name: Not on file    Number of children: Not on file    Years of education: Not on file    Highest education level: Not on file   Occupational History    Not on file   Tobacco Use    Smoking status: Every Day     Packs/day: 1.00     Years: 34.00     Pack years: 34.00     Types: Cigarettes    Smokeless tobacco: Never   Substance and Sexual Activity    Alcohol use: Yes     Comment: occasionally    Drug use: Yes     Types: Marijuana Aloma Itzel)    Sexual activity: Not Currently     Partners: Male   Other Topics Concern    Not on file Social History Narrative    Not on file     Social Determinants of Health     Financial Resource Strain: Low Risk     Difficulty of Paying Living Expenses: Not hard at all   Food Insecurity: No Food Insecurity    Worried About Running Out of Food in the Last Year: Never true    Ran Out of Food in the Last Year: Never true   Transportation Needs: Not on file   Physical Activity: Not on file   Stress: Not on file   Social Connections: Not on file   Intimate Partner Violence: Not on file   Housing Stability: Not on file     Family History   Problem Relation Age of Onset    Breast Cancer Paternal Aunt            Objective:   Vitals:  Temp 97.9 °F (36.6 °C)   Ht 5' 7\" (1.702 m)   Wt 163 lb (73.9 kg)   LMP 07/14/2020   BMI 25.53 kg/m² Pain Score:  10 - Worst pain ever      Physical Exam  Vitals reviewed. Cardiovascular:      Pulses:           Dorsalis pedis pulses are 2+ on the right side and 2+ on the left side. Posterior tibial pulses are 2+ on the right side and 2+ on the left side. Comments: Skin temperature gradient is warm to warm bilaterally. Hair growth noted bilaterally. No focal edema noted to the medial right foot or ankle. Musculoskeletal:      Right lower leg: No edema. Left lower leg: No edema. Right foot: Deformity and bunion present. Left foot: Deformity and bunion present. Feet:      Right foot:      Protective Sensation: 10 sites tested. 10 sites sensed. Skin integrity: Callus present. Toenail Condition: Right toenails are normal.      Left foot:      Protective Sensation: 10 sites tested. 10 sites sensed. Skin integrity: Callus present. Toenail Condition: Left toenails are normal.      Comments: Rectus foot type noted bilaterally. MMT graded 5/5 for all extrinsic foot muscle groups bilaterally, pain elicited with resisted inversion of the right foot.   There is tenderness to palpation of the insertion of the posterior tibial tendon, no alek rupture appreciated. There is tenderness to palpation of the body of the posterior tibial tendon extending from the insertion to posterior to the medial malleolus  Hallux abductovalgus deformity with functional hallux limitus noted bilaterally. Tailor's bunionette deformity with adductovarus rotation deformity of the fifth toe noted bilaterally. Pain and crepitus noted with range of motion of the bilateral subtalar joint and ankle joint, more severe pain with passive range of motion on the right lower extremity. There is tenderness to palpation of the bilateral sinus tarsi as well as the medial ankle gutters. Bony prominence noted at the tarsometatarsal joints of bilateral stent, no pain with range of motion of the corresponding joints. Decreased ankle joint dorsiflexion noted bilaterally, this improves with knee flexion. No ligamentous laxity noted to the bilateral ankle. Lymphadenopathy:      Comments: Popliteal lymph nodes are soft and nontender. Skin:     General: Skin is warm and dry. Capillary Refill: Capillary refill takes less than 2 seconds. Comments: No open lesions noted bilaterally. Normal skin turgor noted bilaterally. Focal hyperkeratotic lesions noted to the bilateral foot at the head of the fifth dorsal, at the medial aspect of the bilateral hallux, and the medial aspect of the right heel. Neurological:      Mental Status: She is alert and oriented to person, place, and time. Deep Tendon Reflexes: Babinski sign absent on the right side. Babinski sign absent on the left side. Reflex Scores:       Patellar reflexes are 1+ on the right side and 1+ on the left side. Achilles reflexes are 0 on the right side and 0 on the left side. Comments: No ankle clonus noted bilaterally. Positive Tinel's sign noted with percussion of the right saphenous nerve, superficial peroneal nerve and tibial nerve.   Positive Tinel's sign noted with percussion of the left common peroneal nerve, superficial peroneal nerve, deep peroneal nerve, and tibial nerve. Vibratory sensation is intact bilaterally. Assessment:      Diagnosis Orders   1. Right foot pain  MRI FOOT RIGHT WO CONTRAST      2. Left foot pain        3. Posterior tibial tendon dysfunction (PTTD) of right lower extremity  MRI FOOT RIGHT WO CONTRAST      4. Other synovitis and tenosynovitis, right ankle and foot  MRI FOOT RIGHT WO CONTRAST          Plan:     Posterior tibial tendon dysfunction right foot, synovitis right subtalar joint: Patient encouraged to continue use of the AFO with a supportive shoe. I recommend that she avoid walking barefoot. Patient encouraged to remove the device to perform range of motion exercises daily. Awaiting PT eval and treatment. Patient encouraged to continue being followed by pain management. I have recommended and submitted an order for an MRI of the right hindfoot, will call patient to have her return to clinic sooner as needed based on the imaging. Patient instructed to call if she experiences any further problems or complications. Orders Placed This Encounter   Procedures    MRI FOOT RIGHT WO CONTRAST     Standing Status:   Future     Standing Expiration Date:   12/20/2023     Order Specific Question:   Reason for exam:     Answer:   pain and deformity of rearfoot     Order Specific Question:   What is the sedation requirement? Answer:   None     Order Specific Question:   What is the area of interest?     Answer:   Hindfoot           Follow up:  Return in about 5 weeks (around 1/24/2023). Igor Hopson DPM    Level of medical decision making: low. Please note that this report has been partially produced using speech recognition software which may cause errors including grammar, punctuation, and spelling or words and phrases that may seem inappropriate. If there are questions or concerns please feel free to contact me for clarification.

## 2023-01-04 ENCOUNTER — HOSPITAL ENCOUNTER (OUTPATIENT)
Dept: MRI IMAGING | Age: 53
Discharge: HOME | End: 2023-01-06
Payer: MEDICAID

## 2023-01-04 DIAGNOSIS — M79.671 RIGHT FOOT PAIN: ICD-10-CM

## 2023-01-04 DIAGNOSIS — M65.871 OTHER SYNOVITIS AND TENOSYNOVITIS, RIGHT ANKLE AND FOOT: ICD-10-CM

## 2023-01-04 DIAGNOSIS — M76.821 POSTERIOR TIBIAL TENDON DYSFUNCTION (PTTD) OF RIGHT LOWER EXTREMITY: ICD-10-CM

## 2023-01-04 PROCEDURE — 73718 MRI LOWER EXTREMITY W/O DYE: CPT

## 2023-01-05 ENCOUNTER — HOSPITAL ENCOUNTER (OUTPATIENT)
Dept: PHYSICAL THERAPY | Age: 53
Setting detail: THERAPIES SERIES
Discharge: HOME | End: 2023-01-05
Payer: MEDICAID

## 2023-01-05 PROCEDURE — 97110 THERAPEUTIC EXERCISES: CPT

## 2023-01-05 PROCEDURE — 97162 PT EVAL MOD COMPLEX 30 MIN: CPT

## 2023-01-05 ASSESSMENT — PAIN DESCRIPTION - LOCATION: LOCATION: ANKLE;FOOT

## 2023-01-05 ASSESSMENT — PAIN SCALES - GENERAL: PAINLEVEL_OUTOF10: 4

## 2023-01-05 ASSESSMENT — PAIN DESCRIPTION - FREQUENCY: FREQUENCY: CONTINUOUS

## 2023-01-05 ASSESSMENT — PAIN DESCRIPTION - DESCRIPTORS: DESCRIPTORS: DULL;ACHING

## 2023-01-05 ASSESSMENT — PAIN DESCRIPTION - ORIENTATION: ORIENTATION: RIGHT

## 2023-01-05 NOTE — PROGRESS NOTES
[address]  PHYSICAL THERAPY PLAN OF CARE   [address], [address] [facility]         Ph: [phone]  Fax: [phone]    [] Certification  [] Recertification [x]  Plan of Care  [] Progress Note [] Discharge      Referring Provider: Aime Plascencia MD     From:  Claudean Reagin, PT, DPT  Patient: Dorita Tran (46 y.o. female) : 1970 Date: 2023  Medical Diagnosis: Chronic pain of both ankles [M25.571, G89.29, M25.572]       Treatment Diagnosis: R foot pain, R foot/ankle weakness, antalgic gait with decreased ambulatory tolerance    Progress Report Period from:  2023  to 2023    Visits to Date: 1 No Show: 0 Cancelled Appts: 0    OBJECTIVE:   Long Term Goals - Time Frame for Long Term Goals : 6-8 weeks  Goals Current/ Discharge status Status   Long Term Goal 1: Pt will be ambulatory community distances > 1000ft w/o visible antalgia or increased pain Ambulation  Surface: Carpet  Device: No Device  Assistance: Independent  Gait Deviations: Slow Mariluz, Decreased step length  Distance: 15ft  Comments: antalgic gait pattern; arch collapse sy feet R>L, decreased push off     New   Long Term Goal 2: Pt will improve R ankle foot strength to 5/5 for improve ambulatory tolerance  Strength LLE  L Ankle Dorsiflexion: 4+/5  L Ankle Plantar Flexion: 4+/5  L Ankle Inversion: 4+/5  L Ankle Eversion: 4+/5  L Great Toe Extension: 4+/5  L Great Toe Flexion: 4+/5    Strength RLE  R Ankle Dorsiflexion: 4/5  R Ankle Plantar flexion: 4/5  R Ankle Inversion: 4-/5  R Ankle Eversion: 4-/5  R Great Toe Extension: 4/5  R Great Toe Flexion: 4/5      New   Long Term Goal 3: Pt will report decreased R foot pain to </=2/10 at worst for improved ambulatory function  Pain Screening  Patient Currently in Pain: Yes  Pain Assessment: 0-10  Pain Level: 4  Best Pain Level: 4  Worst Pain Level: 10  Pain Location: Ankle, Foot  Pain Orientation: Right  Pain Descriptors: Dull, Aching  Pain Frequency: Continuous  Aggravating factors: Walking, Standing, Movement  Pain Management/Relieving Factors: Rest, Ice, Heat, Sitting, Medications     New   Long Term Goal 4: LEFS score improvement at least 9 points to demo clinical improvement in ambulatory function  Exam: LEFS: 27/80; pt self reports 25% PLOF     New   Long Term Goal 5: [place] with HEP and therapeutic pain control techniques to self manage symptoms upon discharge  HEP started today   New     Body Structures, Functions, Activity Limitations Requiring Skilled Therapeutic Intervention: Increased pain, Decreased strength, Decreased ROM, Decreased functional mobility , Decreased balance, Decreased high-level IADLs  Assessment: Pt is a 47 yo female presenting with chronic R foot/ankle pain on the medial side of her foot. Pt displays decreased foot and ankle strength that results in arch collapse and reported pain. Pt can benefit from PT services to work on pain control and progressive strengthening for increased ambulatory tolerance. Discussed with pt foot functional anatomy and recommended ways to decrease mechanical stress on her medial foot by using her AFO or walking boot. HEP started today to begin addressing strength and arch support deficits. Therapy Prognosis: Good, Fair      PT Education: PT Role;Plan of Care;Goals; Evaluative findings; Anatomy of condition (potential benefit of AFO and/or walking boot)    PLAN: [x] Evaluate and Treat  Frequency/Duration:  Plan Frequency: 1-2  Plan weeks: 6-8  Current Treatment Recommendations: Strengthening, Balance training, Functional mobility training, Gait training, Stair training, Neuromuscular re-education, Manual, Pain management, Home exercise program, Patient/Caregiver education & training, Equipment evaluation, education, & procurement, Modalities, Dry needling  Modalities: Heat/Cold, Ultrasound, E-stim - unattended                       Patient Status:[x] Continue/ Initiate plan of Care     [] Discharge PT.  Recommend pt continue with HEP. [] Additional visits requested, Please re-certify for additional visits:     [] Hold        Signature: Electronically signed by Monse Mays PT on 1/5/23 at 12:08 PM EST      If you have any questions or concerns, please don't hesitate to call. Thank you for your referral.    I have reviewed this plan of care and certify a need for medically necessary rehabilitation services.     Physician Signature:__________________________________________________________  Date:  Please sign and return

## 2023-01-05 NOTE — PROGRESS NOTES
[facility]  PHYSICAL THERAPY EVALUATION      Physical Therapy: Initial Evaluation    Patient: Ben Villarreal (33 y.o.     female)   Examination Date: 2023   :  1970 ;    Daxa Gilmore MRN: [id number]  CSN: [id number]   Insurance: Payor: [place]ICA[place] / Plan: [address] [place] [address] DEPT OF JOB / Product Type: *No Product type* /   Insurance ID: [id number] - (Medicaid) Secondary Insurance (if applicable):    Referring Physician: Isabela Dennis MD       Visits to Date/Visits Approved:  (BMN)    No Show/Cancelled Appts: 0 /      Medical Diagnosis: Chronic pain of both ankles [M25.571, G89.29, M25.572]        Treatment Diagnosis: R foot pain, R foot/ankle weakness, antalgic gait with decreased ambulatory tolerance     PERTINENT MEDICAL HISTORY   Patient Assessed for Rehabilitation Services: Yes       Medical History: Chart Reviewed: Yes   Past Medical History:   Diagnosis Date    Bipolar 1 disorder ([facility] [address] 2016    Hemangioma     ct abdomen, left lobe    IBS (irritable bowel syndrome)     Liver lesion, right lobe 2016    stable between 2 readings    Thoracic back pain      Surgical History:   Past Surgical History:   Procedure Laterality Date    APPENDECTOMY  6/10/16    laparoscopic Dr Priti Vasqeuz  2016    w/bx     CYST REMOVAL      DENTAL SURGERY      NECK SURGERY      TUBAL LIGATION         Medications:   Current Outpatient Medications:     lidocaine (LMX) 4 % cream, Apply a half dollar sized amount to intact skin topically up to twice daily as needed for pain, Disp: 45 g, Rfl: 1    meloxicam (MOBIC) 7.5 MG tablet, Take 1 tablet by mouth daily for 7 days Take with food, avoid other NSAIDs (Ibuprofen) and steroids, Disp: 7 tablet, Rfl: 0    gabapentin (NEURONTIN) 300 MG capsule, , Disp: , Rfl:     diclofenac sodium (VOLTAREN) 1 % GEL, Apply 4 g topically 4 times daily, Disp: 100 g, Rfl: 5 DULoxetine (CYMBALTA) 30 MG extended release capsule, TAKE ONE CAPSULE BY MOUTH EVERY MORNING, Disp: , Rfl:     DULoxetine (CYMBALTA) 60 MG extended release capsule, Take 90 mg by mouth daily , Disp: , Rfl:   Allergies: Demerol, Meperidine, and Sulfa antibiotics      Imaging (if applicable): MRI FOOT RIGHT WO CONTRAST    Result Date: 1/5/2023  EXAMINATION: MRI OF THE RIGHT FOOT WITHOUT CONTRAST, 1/4/2023 10:12 am TECHNIQUE: Multiplanar multisequence MRI of the right foot was performed without the administration of intravenous contrast. COMPARISON: Radiographs 04/05/2022 HISTORY: ORDERING SYSTEM PROVIDED HISTORY: Right foot pain, Posterior tibial tendon dysfunction (PTTD) of right lower extremity, Other synovitis and tenosynovitis, right ankle and foot TECHNOLOGIST PROVIDED HISTORY: Reason for exam:->pain and deformity of rearfoot What is the sedation requirement?->None What is the area of interest?->Hindfoot What reading provider will be dictating this exam?->CRC FINDINGS: PLANTAR FASCIA: No significant thickening or evidence of acute tear. ACHILLES TENDON: Achilles tendon is intact without significant thickening or evidence of acute tear. BONE MARROW: There is mild subchondral edema-like marrow signal at the medial talar dome without an obvious detached osteochondral fragment. No acute fracture is seen. There is an os navicularis. Mild edema is seen at the medial aspect of the talar head. JOINT SPACES: Small volume tibiotalar and subtalar joint fluid. Mild degenerative changes are seen. SYNDESMOTIC LIGAMENTS: AITFL and PITFL are intact. LATERAL COLLATERAL LIGAMENT COMPLEX: ATFL, PTFL, and calcaneofibular ligaments are intact. DELTOID LIGAMENT COMPLEX: No evidence of an acute deltoid ligament injury. SINUS TARSI AND SPRING LIGAMENT: Sinus tarsi fat is preserved. There is mild thickening and increased intrasubstance signal of the superomedial bundle of the spring ligament.  MEDIAL TENDONS: Mild thickening and increased intrasubstance signal of the distal insertional posterior tibial tendon. Flexor digitorum longus and flexor hallucis longus tendons are intact. LATERAL TENDONS: Peroneus longus and brevis tendons are intact. ANTERIOR TENDONS: Anterior tibial and extensor tendons are intact. TARSAL TUNNEL: Unremarkable. Mild distal insertional posterior tibial tendinosis. Mild sprain of the superomedial bundle of the spring ligament. Mild edema-like marrow signal is seen at the medial aspect of the talar head, adjacent to the spring ligament. Nonspecific mild subchondral edema-like marrow signal at the medial talar dome. SUBJECTIVE EXAMINATION     History obtained from[de-identified] Patient, Chart Review,      Family/Caregiver Present: No    Subjective History: Onset Date:  (10 month to 1 year history)  Subjective: Pt presents with c/o ongoing R ankle/foot pain that has been present for the last 10 months to 1 year. Pt reports no known mechanism of injury prior to pain onset but has a hx 3rd, 4th, 5th toe fractures ~ 12 years ago that she managed conservatively with a walking boot. Pt expresses pain started upon returning work at [employer] after being ill with Covid for 4 weeks around Thanksgiving of 2021. At this time pt expresses pain inferior to her medial malleolus that increases with standing, walking, and driving. Pain tends to calm down with rest, ibuprofen, topicals, CBD topicals, ice/heat, and soaking. Pt wears a R foot AFO sometimes but did not bring it today.   Additional Pertinent Hx (if applicable): fibromyalgia, arthritis   Prior diagnostic testing[de-identified] MRI      Learning/Language: Learning  Does the patient/guardian have any barriers to learning?: No barriers  Will there be a co-learner?: No  What is the preferred language of the patient/guardian?: English  Is an  required?: No  How does the patient/guardian prefer to learn new concepts?: Listening, Reading, Demonstration, Pictures/Videos     Pain Screening Pain Screening  Patient Currently in Pain: Yes  Pain Assessment: 0-10  Pain Level: 4  Best Pain Level: 4  Worst Pain Level: 10  Pain Location: Ankle, Foot  Pain Orientation: Right  Pain Descriptors: Dull, Aching  Pain Frequency: Continuous  Aggravating factors: Walking, Standing, Movement  Pain Management/Relieving Factors: Rest, Ice, Heat, Sitting, Medications    Functional Status    Social History:    Social History  Lives With: Alone  Type of Home: Mobile home  Home Layout: One level  Home Access: Stairs to enter with rails  Entrance Stairs - Number of Steps: 3  Bathroom Shower/Tub: Tub/Shower unit  Home Equipment: Cane    Occupation/Interests:    Former physical ; currently not working    Prior Level of Function:     Independent        Current Level of Function:   Independent      ADL Assistance: Independent  Homemaking Assistance: Independent  Ambulation Assistance: Independent  Transfer Assistance: Independent  Active : Yes         OBJECTIVE EXAMINATION     Review of Systems:  Vision: Impaired  Visual Deficits: Wears glasses (reading)  Hearing: Within functional limits  Follows Commands: Within Functional Limits    Observations:   Observations  General Observations WB: arch collapse bilaterall, R>L  General Observations NWB: medial ankle edema, inferior to medial malleolus    Palpation:   Right Ankle Palpation: tenderness inferior to medial malleolus    Mobility:   Ambulation  Surface: Carpet  Device: No Device  Assistance: Independent  Gait Deviations: Slow Mariluz, Decreased step length  Distance: 15ft  Comments: antalgic gait pattern; arch collapse sy feet R>L, decreased push off  Stairs/Curb  Stairs?: No (non-recip patter verbalized to prevent pain)      Left AROM  Right AROM         AROM LLE (degrees)  L Ankle Dorsiflexion (0-20): 15  L Ankle Plantar Flexion (0-45): 45  L Ankle Forefoot Inversion (0-40): 40  L Ankle Forefoot Eversion (0-20): 20    AROM RLE (degrees)  R Ankle Dorsiflexion (0-20): 15  R Ankle Plantar Flexion (0-45): 45  R Ankle Forefoot Inversion (0-40): 40  R Ankle Forefoot Eversion (0-20): 20        Left Strength  Right Strength         Strength LLE  L Ankle Dorsiflexion: 4+/5  L Ankle Plantar Flexion: 4+/5  L Ankle Inversion: 4+/5  L Ankle Eversion: 4+/5  L Great Toe Extension: 4+/5  L Great Toe Flexion: 4+/5         Strength RLE  R Ankle Dorsiflexion: 4/5  R Ankle Plantar flexion: 4/5  R Ankle Inversion: 4-/5  R Ankle Eversion: 4-/5  R Great Toe Extension: 4/5  R Great Toe Flexion: 4/5                 Outcomes Score:  Exam: LEFS: 27/80; pt self reports 25% PLOF    Treatment:    Exercises:   Exercises  Exercise 1: HEP: arch lifts, towel scrunches, ankle PF/Inv/Ev isometrics (performed today with handouts provided)  Exercise 2: *seated ankle PF with hold  Exercise 3: *ankle T-band with hold  Exercise 4: *heel to toe weight shifts on foam  Exercise 5: *Alter-G or Treadmill  Exercise 6: **pulsed US over medial foot  Exercise 7: **KT tape arch support     Modalities:  Modalities  Modalities:  (MHP/CP/E-stim/Ultrasound*)        Manual:  Manual Therapy  Other: *arch support taping, R foot     *Indicates exercise,modality, or manual techniques to be initiated when appropriate       ASSESSMENT     Impression: Assessment: Pt is a 47 yo female presenting with chronic R foot/ankle pain on the medial side of her foot. Pt displays decreased foot and ankle strength that results in arch collapse and reported pain. Pt can benefit from PT services to work on pain control and progressive strengthening for increased ambulatory tolerance. Discussed with pt foot functional anatomy and recommended ways to decrease  stress on her medial foot by using her AFO or walking boot. HEP started today to begin addressing strength and arch support deficits.     Body Structures, Functions, Activity Limitations Requiring Skilled Therapeutic Intervention: Increased pain, Decreased strength, Decreased ROM, Decreased functional mobility , Decreased balance, Decreased high-level IADLs    Statement of Medical Necessity: Physical Therapy is both indicated and medically necessary as outlined in the POC to increase the likelihood of meeting the functionally related goals stated below.      Patient's Activity Tolerance: Patient tolerated evaluation without incident      Patient's rehabilitation potential/prognosis is considered to be: Good, Fair    Factors which may impact rehabilitation potential include: Medical co-morbidities, Chronicity of problem     Patient Education: PT Education: PT Role, Plan of Care, Goals, Evaluative findings, Anatomy of condition (potential benefit of AFO and/or walking boot)     GOALS   Patient Goal(s): Patient Goals : \"get back to normal function w/o foot pain\"    Long Term Goals Completed by 6-8 weeks Goal Status   LTG 1 Pt will be ambulatory community distances > 1000ft w/o visible antalgia or increased pain New   LTG 2 Pt will improve R ankle foot strength to 5/5 for improve ambulatory tolerance New   LTG 3 Pt will report decreased R foot pain to </=2/10 at worst for improved ambulatory function New   LTG 4 LEFS score improvement at least 9 points to demo clinical improvement in ambulatory function New   LTG 5 [place] with HEP and therapeutic pain control techniques to self manage symptoms upon discharge New        TREATMENT PLAN       Requires PT Follow-Up: Yes    Treatment may include any combination of the following: Strengthening, Balance training, Functional mobility training, Gait training, Stair training, Neuromuscular re-education, Manual, Pain management, Home exercise program, Patient/Caregiver education & training, Equipment evaluation, education, & procurement, Modalities, Dry needling  Modalities: Heat/Cold, Ultrasound, E-stim - unattended     Frequency / Duration:  Patient to be seen 1-2 times per week for 6-8 weeks  Plan Comment:            Eval Complexity:   Decision Making: Medium Complexity  History: Personal Factors and/or Comorbidities Impacting POC: Medium  History: arthritis, fibromyalgia, chronic pain  Examination of body system(s) including body structures and functions, activity limitations, and/or participation restrictions: Medium  Exam: LEFS: 27/80; pt self reports 25% PLOF  Clinical Presentation: Medium  Clinical Presentation: evolving    POST-PAIN     Pain Rating (0-10 pain scale):  4 /10  Location and pain description same as pre-treatment unless indicated. Action: [] NA  [] Call Physician  [x] Perform HEP  [x] Meds as prescribed    Evaluation and patient rights have been reviewed and patient agrees with plan of care. Yes  [x]  No  []   Explain:     Leticia Fall Risk Assessment  Risk Factor Scale  Score   History of Falls [] Yes  [x] No 25  0    Secondary Diagnosis [x] Yes   (foot pain)  [] No 15  0 15   Ambulatory Aid [] Furniture  [] Crutches/cane/walker  [x] None/bedrest/wheelchair/nurse 30  15  0    IV/Heparin Lock [] Yes  [x] No 20  0    Gait/Transferring [] Impaired  [] Weak  [x] Normal/bedrest/immobile 20  10  0    Mental Status [] Forgets limitations  [x] Oriented to own ability 15  0       Total: 15     Based on the Assessment score: check the appropriate box.   []  No intervention needed   Low =   Score of 0-24  [x]  Use standard prevention interventions Moderate =  Score of 24-44   [] Discuss fall prevention strategies   [] Indicate moderate falls risk on eval  []  Use high risk prevention interventions High = Score of 45 and higher   [] Discuss fall prevention strategies   [] Provide supervision during treatment time      Minutes:  PT Individual Minutes  Time In: 0900  Time Out: 0955  Minutes: 55  Timed Code Treatment Minutes: 12 Minutes  Procedure Minutes: 43 min eval     Timed Activity Minutes Units   Ther Ex 12 1     Electronically signed by Monse Mays PT on 1/5/23 at 12:05 PM EST

## 2023-01-13 ENCOUNTER — HOSPITAL ENCOUNTER (OUTPATIENT)
Dept: PHYSICAL THERAPY | Age: 53
Setting detail: THERAPIES SERIES
Discharge: HOME | End: 2023-01-13
Payer: MEDICAID

## 2023-01-13 NOTE — PROGRESS NOTES
Therapy                            Cancellation/No-show Note      Date: 2023  Patient: Marv Brown (24 y.o. female)  : 1970  MRN:  [id number]  Referring Physician: Nasrin Chung MD    Medical Diagnosis: Chronic pain of both ankles [M25.571, G89.29, M25.572]      Visit Information:  Visits to Date 1   No Show/Cancelled Appts: 0       For today's appointment patient:  [x]  Cancelled  []  Rescheduled appointment  []  No-show   []  Called pt to remind of next appointment     Reason given by patient:  [x]  Patient ill  []  Conflicting appointment  []  No transportation    []  Conflict with work  []  No reason given  []  Other:      [x] Pt has future appointments scheduled, no follow up needed  [] Pt requests to be on hold.     Reason:   If > 2 weeks please discuss with therapist.  [] Therapist to call pt for follow up      Signature: Electronically signed by Melanie Marcos PT on 23 at 9:09 AM EST

## 2023-01-27 ENCOUNTER — HOSPITAL ENCOUNTER (OUTPATIENT)
Dept: PHYSICAL THERAPY | Age: 53
Setting detail: THERAPIES SERIES
Discharge: HOME | End: 2023-01-27
Payer: MEDICAID

## 2023-01-27 PROCEDURE — 97035 APP MDLTY 1+ULTRASOUND EA 15: CPT

## 2023-01-27 PROCEDURE — 97110 THERAPEUTIC EXERCISES: CPT

## 2023-01-27 ASSESSMENT — PAIN SCALES - GENERAL: PAINLEVEL_OUTOF10: 10

## 2023-01-27 ASSESSMENT — PAIN DESCRIPTION - FREQUENCY: FREQUENCY: CONTINUOUS

## 2023-01-27 ASSESSMENT — PAIN DESCRIPTION - ORIENTATION: ORIENTATION: RIGHT

## 2023-01-27 ASSESSMENT — PAIN DESCRIPTION - DESCRIPTORS: DESCRIPTORS: THROBBING

## 2023-01-27 NOTE — PROGRESS NOTES
[facility]  Outpatient Physical Therapy    Treatment Note        Date: 2023  Patient: Lillian Dyson  : 1970   Confirmed: Yes  MRN: [id number]  Referring Provider: Rafael Denson MD    Medical Diagnosis: Chronic pain of both ankles [M25.571, G89.29, M25.572]       Treatment Diagnosis: R foot pain, R foot/ankle weakness, antalgic gait with decreased ambulatory tolerance    Visit Information:  Insurance: Payor: MEDICAID [place] / Plan: Gayla Davis DEPT OF JOB / Product Type: *No Product type* /   PT Visit Information  Onset Date:  (10 month to 1 year history)  PT Insurance Information: Medicaid  Total # of Visits Approved: 80 (BMN)  Total # of Visits to Date: 2  No Show: 1  Canceled Appointment: 1  Progress Note Counter:  --    Subjective Information:  Subjective: Patient reports her Rt ankle hurts more then the Lt. \"It hurts with anything I do. \" Says her Rt ankle can feel like it wants to give out and \"its a constant throb. \" Intermittent cramping in the gastroc and in the foot/toes. HEP Compliance:  [x] Good [] Fair [] Poor [] Reports not doing due to:    Pain Screening  Patient Currently in Pain: Yes  Pain Assessment: 0-10  Pain Level: 10  Worst Pain Level: 10  Pain Orientation: Right  Pain Descriptors: Throbbing (\"inner deep\")  Pain Frequency: Continuous    Treatment:  Exercises:  Exercises  Exercise 1: HEP: arch lifts, towel scrunches, ankle PF/Inv/Ev isometrics  Exercise 2: seated ankle PF with hold x 10 5s  Exercise 3: Ankle T-band with hold x 10 -5s YTB  Exercise 6: pulsed US over medial foot - in modalities  Exercise 7: KT tape arch support to R foot       Manual:   Manual Therapy  Other: arch support taping, R foot - x 3 minutes     Modalities:  Patient Position: Prone  US Location: Right  US Frequency: 3Mhz  US Intensity: 1 W/cm^2  US mode: Pulsed: 50%  US Time: 8 minutes  Post tx skin assessment: Intact    Objective Measures:        Assessment:    Body Structures, Functions, Activity Limitations Requiring Skilled Therapeutic Intervention: Increased pain, Decreased strength, Decreased ROM, Decreased functional mobility , Decreased balance, Decreased high-level IADLs  Assessment: Initiated R ankle and foot strengthening and mobility this date with good tolerance. Patient noted no increased pain with tband ankle strengthening with yellow theraband. Performed pulsed ultrasound to medial and lateral R ankle to improve circulation and reduce pain. KT tape applied to Rt arch to assist with better arch support when ambulating. Treatment Diagnosis: R foot pain, R foot/ankle weakness, antalgic gait with decreased ambulatory tolerance  Therapy Prognosis: Good, Fair         Post-Pain Assessment:       Pain Rating (0-10 pain scale):   7/10   Location and pain description same as pre-treatment unless indicated.    Action: [] NA   [x] Perform HEP  [] Meds as prescribed  [] Modalities as prescribed   [] Call Physician     GOALS   Patient Goal(s): Patient Goals : \"get back to normal function w/o foot pain\"    Long Term Goals Completed by 6-8 weeks Goal Status   LTG 1 Pt will be ambulatory community distances > 1000ft w/o visible antalgia or increased pain In progress   LTG 2 Pt will improve R ankle foot strength to 5/5 for improve ambulatory tolerance In progress   LTG 3 Pt will report decreased R foot pain to </=2/10 at worst for improved ambulatory function In progress   LTG 4 LEFS score improvement at least 9 points to demo clinical improvement in ambulatory function In progress   LTG 5 [place] with HEP and therapeutic pain control techniques to self manage symptoms upon discharge In progress   Plan:  Frequency/Duration:  Plan  Plan Frequency: 1-2  Plan weeks: 6-8  Specific Instructions for Next Treatment: Check ROM NV  Current Treatment Recommendations: Strengthening, Balance training, Functional mobility training, Gait training, Stair training, Neuromuscular re-education, Manual, Pain management, Home exercise program, Patient/Caregiver education & training, Equipment evaluation, education, & procurement, Modalities, Dry needling  Modalities: Heat/Cold, Ultrasound, E-stim - unattended  Pt to continue current HEP. See objective section for any therapeutic exercise changes, additions or modifications this date.     Therapy Time:      PT Individual Minutes  Time In: 1116  Time Out: 1200  Minutes: 44  Timed Code Treatment Minutes: 44 Minutes  Procedure Minutes: 8 min US  Timed Activity Minutes Units   Ther Ex 33 2   Manual  3 0   Ultrasound 8 1     Electronically signed by Yumiko Fleming PTA on 1/27/23 at 12:45 PM EST

## 2023-02-03 ENCOUNTER — HOSPITAL ENCOUNTER (OUTPATIENT)
Dept: PHYSICAL THERAPY | Age: 53
Setting detail: THERAPIES SERIES
Discharge: HOME | End: 2023-02-03
Payer: MEDICAID

## 2023-02-03 PROCEDURE — 97110 THERAPEUTIC EXERCISES: CPT

## 2023-02-03 PROCEDURE — 97035 APP MDLTY 1+ULTRASOUND EA 15: CPT

## 2023-02-03 ASSESSMENT — PAIN DESCRIPTION - ORIENTATION: ORIENTATION: RIGHT;INNER;OUTER

## 2023-02-03 ASSESSMENT — PAIN SCALES - GENERAL: PAINLEVEL_OUTOF10: 7

## 2023-02-03 ASSESSMENT — PAIN DESCRIPTION - DESCRIPTORS: DESCRIPTORS: ACHING

## 2023-02-03 ASSESSMENT — PAIN DESCRIPTION - LOCATION: LOCATION: ANKLE;FOOT

## 2023-02-03 NOTE — PROGRESS NOTES
[address] [facility]  Outpatient Physical Therapy    Treatment Note        Date: 2/3/2023  Patient: Paulina Maguire  : 1970   Confirmed: Yes  MRN: [id number]  Referring Provider: Kylie Brown MD    Medical Diagnosis: Chronic pain of both ankles [M25.571, G89.29, M25.572]       Treatment Diagnosis: R foot pain, R foot/ankle weakness, antalgic gait with decreased ambulatory tolerance    Visit Information:  Insurance: Payor: MEDICAID [place] / Plan: [place] [facility] DEPT OF JOB / Product Type: *No Product type* /   PT Visit Information  Onset Date:  (10 month to 1 year history)  PT Insurance Information: Medicaid  Total # of Visits Approved: 80 (BMN)  Total # of Visits to Date: 3  No Show: 1  Canceled Appointment: 1  Progress Note Counter: 3/8-16    Subjective Information:  Subjective: Patient reports her Rt ankle \"feels about the same. \" Reports the pain was reduced \" for a little bit longer\" after the modalities and KT tape was applied.   HEP Compliance:  [x] Good [] Fair [] Poor [] Reports not doing due to:    Pain Screening  Patient Currently in Pain: Yes  Pain Assessment: 0-10  Pain Level: 7  Pain Location: Ankle, Foot  Pain Orientation: Right, Inner, Outer  Pain Descriptors: Aching (\"Deep ache\")    Treatment:  Exercises:  Exercises  Exercise 1: HEP: arch lifts, towel scrunches, ankle PF/Inv/Ev isometrics  Exercise 2: seated ankle PF with hold x 10 5s  Exercise 3: Ankle T-band with hold x 15 -5s YTB  Exercise 4: heel to toe weight shifts on foam x 10 ea  Exercise 8: towel scrunches x 20       Manual:   Manual Therapy  Other: arch support taping, R foot - x 3 minutes       Modalities:  Ultrasound (CPT X922960)  Patient Position: Prone  Ultrasound location: Right  Ultrasound frequency: 3 MHz  Ultrasound intensity (W/cm2): 1  Ultrasound mode: Pulsed (50%)  Ultrasound Parameters: 8 minutes  Post treatment skin assessment: Intact       *Indicates exercise, modality, or manual techniques to be initiated when appropriate    Objective Measures:          Assessment: Body Structures, Functions, Activity Limitations Requiring Skilled Therapeutic Intervention: Increased pain, Decreased strength, Decreased ROM, Decreased functional mobility , Decreased balance, Decreased high-level IADLs  Assessment: Continued to progress patient through R ankle strengthening and mobilization exercises with improved tolerance. Initiated additional exercises including standing heel/toe raises and towel scrunches from HEP with proper demo and tolerance. Newly issued HEP given to patient this date to continue with other exercises at home. Treatment Diagnosis: R foot pain, R foot/ankle weakness, antalgic gait with decreased ambulatory tolerance  Therapy Prognosis: Good, Fair       Post-Pain Assessment:       Pain Rating (0-10 pain scale):  5 /10   Location and pain description same as pre-treatment unless indicated.    Action: [] NA   [x] Perform HEP  [] Meds as prescribed  [] Modalities as prescribed   [] Call Physician     GOALS   Patient Goal(s): Patient Goals : \"get back to normal function w/o foot pain\"        Long Term Goals Completed by 6-8 weeks Goal Status   LTG 1 Pt will be ambulatory community distances > 1000ft w/o visible antalgia or increased pain In progress   LTG 2 Pt will improve R ankle foot strength to 5/5 for improve ambulatory tolerance In progress   LTG 3 Pt will report decreased R foot pain to </=2/10 at worst for improved ambulatory function In progress   LTG 4 LEFS score improvement at least 9 points to demo clinical improvement in ambulatory function In progress   LTG 5 [place] with HEP and therapeutic pain control techniques to self manage symptoms upon discharge In progress   Plan:  Frequency/Duration:  Plan  Plan Frequency: 1-2  Plan weeks: 6-8  Current Treatment Recommendations: Strengthening, Balance training, Functional mobility training, Gait training, Stair training, Neuromuscular re-education, Manual, Pain management, Home exercise program, Patient/Caregiver education & training, Equipment evaluation, education, & procurement, Modalities, Dry needling  Modalities: Heat/Cold, Ultrasound, E-stim - unattended  Pt to continue current HEP. See objective section for any therapeutic exercise changes, additions or modifications this date.     Therapy Time:      PT Individual Minutes  Time In: 1115  Time Out: 1201  Minutes: 46  Timed Code Treatment Minutes: 46 Minutes  Procedure Minutes: 8 min US  Timed Activity Minutes Units   Ther Ex 35 2   Manual  3 0   US 8 1     Electronically signed by Armando Jones PTA on 2/3/23 at 12:11 PM EST

## 2023-02-17 ENCOUNTER — HOSPITAL ENCOUNTER (OUTPATIENT)
Dept: PHYSICAL THERAPY | Age: 53
Setting detail: THERAPIES SERIES
Discharge: HOME | End: 2023-02-17
Payer: COMMERCIAL

## 2023-02-17 NOTE — PROGRESS NOTES
Therapy                            Cancellation/No-show Note      Date: 2023  Patient: Bright Littlejohn (4 y.o. female)  : 1970  MRN:  [id number]  Referring Physician: Kelly Baron MD    Medical Diagnosis: Chronic pain of both ankles [M25.571, G89.29, M25.572]      Visit Information:  Visits to Date 3   No Show/Cancelled Appts:       For today's appointment patient:  [x]  Cancelled  []  Rescheduled appointment  []  No-show   []  Called pt to remind of next appointment     Reason given by patient:  [x]  Patient ill  []  Conflicting appointment  []  No transportation    []  Conflict with work  []  No reason given  []  Other:      [x] Pt has future appointments scheduled, no follow up needed  [] Pt requests to be on hold.     Reason:   If > 2 weeks please discuss with therapist.  [] Therapist to call pt for follow up     Comments:       Signature: Electronically signed by Elba Valderrama PT on 23 at 10:15 AM EST

## 2023-02-24 ENCOUNTER — HOSPITAL ENCOUNTER (OUTPATIENT)
Dept: PHYSICAL THERAPY | Age: 53
Setting detail: THERAPIES SERIES
Discharge: HOME | End: 2023-02-24
Payer: COMMERCIAL

## 2023-02-24 PROCEDURE — 97110 THERAPEUTIC EXERCISES: CPT

## 2023-02-24 ASSESSMENT — PAIN SCALES - GENERAL: PAINLEVEL_OUTOF10: 7

## 2023-02-24 ASSESSMENT — PAIN DESCRIPTION - LOCATION: LOCATION: ANKLE;FOOT

## 2023-02-24 ASSESSMENT — PAIN DESCRIPTION - DESCRIPTORS: DESCRIPTORS: ACHING

## 2023-02-24 ASSESSMENT — PAIN DESCRIPTION - ORIENTATION: ORIENTATION: RIGHT

## 2023-02-24 NOTE — PROGRESS NOTES
[address] [facility]  Outpatient Physical Therapy    Treatment Note        Date: 2023  Patient: Ej Nelson  : 1970   Confirmed: Yes  MRN: [id number]  Referring Provider: Brandyn Pugh MD    Medical Diagnosis: Chronic pain of both ankles [M25.571, G89.29, M25.572]       Treatment Diagnosis: R foot pain, R foot/ankle weakness, antalgic gait with decreased ambulatory tolerance    Visit Information:  Insurance: Payor: [employer] / Plan: Alveria Kaur / Product Type: *No Product type* /   PT Visit Information  Onset Date:  (10 month to 1 year history)  PT Insurance Information: Medicaid  Total # of Visits Approved: 80 (BMN)  Total # of Visits to Date: 4  No Show: 1  Canceled Appointment: 2  Progress Note Counter:     Subjective Information:  Subjective: Pt reports R ankle has been feeling ok since the last visit. States her ankle \"just feels tired. \" Says its hard to rely on it all the time. HEP Compliance:  [x] Good [] Fair [] Poor [] Reports not doing due to:    Pain Screening  Patient Currently in Pain: Yes  Pain Assessment: 0-10  Pain Level: 7  Pain Location: Ankle, Foot  Pain Orientation: Right  Pain Descriptors: Aching    Treatment:  Exercises:  Exercises  Exercise 1: HEP: arch lifts, towel scrunches, ankle PF/Inv/Ev isometrics  Exercise 2: SLS 4 x 15-20s  Exercise 3: Ankle T-band with hold x 20 -5s YTB  Exercise 4: heel to toe weight shifts on foam x 20 ea  Exercise 5: BAPS board x 20 ea - 4 ways  Exercise 8: towel scrunches x 20 - 5s  Exercise 9: Towel swipes x 10 ea direction       Manual:   Manual Therapy  Other: arch support taping, R foot - x 3 minutes     Objective Measures:        Assessment:    Body Structures, Functions, Activity Limitations Requiring Skilled Therapeutic Intervention: Increased pain, Decreased strength, Decreased ROM, Decreased functional mobility , Decreased balance, Decreased high-level IADLs  Assessment: Additional exercises added this date to improve R ankle ROM, stability, and strength with good tolerance. Needed cues to perform tband exercises with proper demo with fair carry over. Patient reports good relief after performing exercises however notes insignificant change in pain level on the 0-10 VAS.  Treatment Diagnosis: R foot pain, R foot/ankle weakness, antalgic gait with decreased ambulatory tolerance  Therapy Prognosis: Good, Fair       Post-Pain Assessment:       Pain Rating (0-10 pain scale):  6 /10   Location and pain description same as pre-treatment unless indicated.   Action: [] NA   [x] Perform HEP  [] Meds as prescribed  [] Modalities as prescribed   [] Call Physician     GOALS   Patient Goal(s): Patient Goals : \"get back to normal function w/o foot pain\"    Long Term Goals Completed by 6-8 weeks Goal Status   LTG 1 Pt will be ambulatory community distances > 1000ft w/o visible antalgia or increased pain In progress   LTG 2 Pt will improve R ankle foot strength to 5/5 for improve ambulatory tolerance In progress   LTG 3 Pt will report decreased R foot pain to </=2/10 at worst for improved ambulatory function In progress   LTG 4 LEFS score improvement at least 9 points to demo clinical improvement in ambulatory function In progress   LTG 5 [place] with HEP and therapeutic pain control techniques to self manage symptoms upon discharge In progress   Plan:  Frequency/Duration:  Plan  Plan Frequency: 1-2  Plan weeks: 6-8  Current Treatment Recommendations: Strengthening, Balance training, Functional mobility training, Gait training, Stair training, Neuromuscular re-education, Manual, Pain management, Home exercise program, Patient/Caregiver education & training, Equipment evaluation, education, & procurement, Modalities, Dry needling  Modalities: Heat/Cold, Ultrasound, E-stim - unattended  Pt to continue current HEP.  See objective section for any therapeutic exercise changes, additions or modifications this  date.    Therapy Time:      PT Individual Minutes  Time In: 1118  Time Out: 1159  Minutes: 41  Timed Code Treatment Minutes: 41 Minutes  Procedure Minutes: 0  Timed Activity Minutes Units   Ther Ex 38 3   Manual  3 0     Electronically signed by Radha Hartley PTA on 2/24/23 at 12:49 PM EST

## 2023-02-28 ENCOUNTER — OFFICE VISIT (OUTPATIENT)
Dept: PODIATRY | Age: 53
End: 2023-02-28
Payer: COMMERCIAL

## 2023-02-28 VITALS — WEIGHT: 155 LBS | TEMPERATURE: 97.1 F | HEIGHT: 67 IN | BODY MASS INDEX: 24.33 KG/M2

## 2023-02-28 DIAGNOSIS — G57.91 NEURITIS OF RIGHT FOOT: ICD-10-CM

## 2023-02-28 DIAGNOSIS — M65.871 OTHER SYNOVITIS AND TENOSYNOVITIS, RIGHT ANKLE AND FOOT: ICD-10-CM

## 2023-02-28 DIAGNOSIS — G57.92 NEURITIS OF LEFT FOOT: ICD-10-CM

## 2023-02-28 DIAGNOSIS — M76.821 POSTERIOR TIBIAL TENDON DYSFUNCTION (PTTD) OF RIGHT LOWER EXTREMITY: ICD-10-CM

## 2023-02-28 DIAGNOSIS — M79.671 RIGHT FOOT PAIN: Primary | ICD-10-CM

## 2023-02-28 DIAGNOSIS — M79.672 LEFT FOOT PAIN: ICD-10-CM

## 2023-02-28 PROCEDURE — 99213 OFFICE O/P EST LOW 20 MIN: CPT | Performed by: PODIATRIST

## 2023-02-28 RX ORDER — METHYLPREDNISOLONE 4 MG/1
TABLET ORAL
Qty: 1 KIT | Refills: 0 | Status: SHIPPED | OUTPATIENT
Start: 2023-02-28

## 2023-02-28 ASSESSMENT — ENCOUNTER SYMPTOMS
BACK PAIN: 1
NAUSEA: 0
VOMITING: 0
SHORTNESS OF BREATH: 0

## 2023-02-28 NOTE — PROGRESS NOTES
[address] [facility]  Dept: [phone]  Loc: [phone]       Lorene Quezada  (1970)    2/28/23    Subjective Teodoro Skiff is 46 y.o. female who complains today of:    Chief Complaint   Patient presents with    Foot Pain     Both feet       HPI: Patient presents for follow-up. Patient reports continued neuritic and arthritic pain bilaterally with continued sharp and achy pain to the right medial rear foot. Patient reports that she did have the MRI performed. Patient says she has been compliant with physical therapy. Patient states that the ankle brace increases her right foot pain when she is wearing it. Review of Systems   Constitutional:  Negative for chills and fever. HENT:  Positive for hearing loss. Respiratory:  Negative for shortness of breath. Cardiovascular:  Negative for chest pain. Gastrointestinal:  Negative for nausea and vomiting. Genitourinary:  Negative for difficulty urinating. Musculoskeletal:  Positive for back pain and gait problem. Skin:  Negative for wound. Neurological:  Negative for numbness. Hematological:  Bruises/bleeds easily. Psychiatric/Behavioral:  Positive for sleep disturbance.         Allergies:  Demerol, Meperidine, and Sulfa antibiotics    Current Outpatient Medications on File Prior to Visit   Medication Sig Dispense Refill    lidocaine (LMX) 4 % cream Apply a half dollar sized amount to intact skin topically up to twice daily as needed for pain 45 g 1    gabapentin (NEURONTIN) 300 MG capsule       diclofenac sodium (VOLTAREN) 1 % GEL Apply 4 g topically 4 times daily 100 g 5    DULoxetine (CYMBALTA) 30 MG extended release capsule TAKE ONE CAPSULE BY MOUTH EVERY MORNING      DULoxetine (CYMBALTA) 60 MG extended release capsule Take 90 mg by mouth daily        No current facility-administered medications on file prior to visit. Past Medical History:   Diagnosis Date    Bipolar 1 disorder ([address] 6/29/2016    Hemangioma 2016    ct abdomen, left lobe    IBS (irritable bowel syndrome)     Liver lesion, right lobe 2007, 2016    stable between 2 readings    Thoracic back pain      Past Surgical History:   Procedure Laterality Date    APPENDECTOMY  6/10/16    laparoscopic Dr Urbano Lamas  06/27/2016    w/bx     CYST REMOVAL      DENTAL SURGERY      NECK SURGERY      TUBAL LIGATION       Social History     Socioeconomic History    Marital status:      Spouse name: Not on file    Number of children: Not on file    Years of education: Not on file    Highest education level: Not on file   Occupational History    Not on file   Tobacco Use    Smoking status: Every Day     Packs/day: 1.00     Years: 34.00     Pack years: 34.00     Types: Cigarettes    Smokeless tobacco: Never   Substance and Sexual Activity    Alcohol use: Yes     Comment: occasionally    Drug use: Yes     Types: Marijuana Abhishek Anand)    Sexual activity: Not Currently     Partners: Male   Other Topics Concern    Not on file   Social History Narrative    Not on file     Social Determinants of Health     Financial Resource Strain: Low Risk     Difficulty of Paying Living Expenses: Not hard at all   Food Insecurity: No Food Insecurity    Worried About Running Out of Food in the Last Year: Never true    Ran Out of Food in the Last Year: Never true   Transportation Needs: Not on file   Physical Activity: Not on file   Stress: Not on file   Social Connections: Not on file   Intimate Partner Violence: Not on file   Housing Stability: Not on file     Family History   Problem Relation Age of Onset    Breast Cancer Paternal Aunt            Objective:   Vitals:  Temp 97.1 °F (36.2 °C)   Ht 5' 7\" (1.702 m)   Wt 155 lb (70.3 kg)   LMP 07/14/2020   BMI 24.28 kg/m² Pain Score:   7      Physical Exam  Vitals reviewed. Cardiovascular:      Pulses:           Dorsalis pedis pulses are 2+ on the right side and 2+ on the left side. Posterior tibial pulses are 2+ on the right side and 2+ on the left side. Comments: Skin temperature gradient is warm to warm bilaterally. Hair growth noted bilaterally. No focal edema noted to the medial right foot or ankle. Musculoskeletal:      Right lower leg: No edema. Left lower leg: No edema. Right foot: Deformity and bunion present. Left foot: Deformity and bunion present. Feet:      Right foot:      Protective Sensation: 10 sites tested. 10 sites sensed. Skin integrity: Callus present. Toenail Condition: Right toenails are normal.      Left foot:      Protective Sensation: 10 sites tested. 10 sites sensed. Skin integrity: Callus present. Toenail Condition: Left toenails are normal.      Comments: Rectus foot type noted bilaterally. MMT graded 5/5 for all extrinsic foot muscle groups bilaterally, pain elicited with resisted inversion of the right foot. There is tenderness to palpation of the insertion of the posterior tibial tendon, no alek rupture appreciated. There is tenderness to palpation of the body of the posterior tibial tendon extending from the insertion to posterior to the medial malleolus  Hallux abductovalgus deformity with functional hallux limitus noted bilaterally. Tailor's bunionette deformity with adductovarus rotation deformity of the fifth toe noted bilaterally. Pain and crepitus noted with range of motion of the bilateral subtalar joint and ankle joint, more severe pain with passive range of motion on the right lower extremity. There is tenderness to palpation of the bilateral sinus tarsi as well as the medial ankle gutters. Bony prominence noted at the tarsometatarsal joints of bilateral stent, no pain with range of motion of the corresponding joints.   Decreased ankle joint dorsiflexion noted bilaterally, this improves with knee flexion. No ligamentous laxity noted to the bilateral ankle. Lymphadenopathy:      Comments: Popliteal lymph nodes are soft and nontender. Skin:     General: Skin is warm and dry. Capillary Refill: Capillary refill takes less than 2 seconds. Comments: No open lesions noted bilaterally. Normal skin turgor noted bilaterally. Focal hyperkeratotic lesions noted to the bilateral foot at the head of the fifth dorsal, at the medial aspect of the bilateral hallux, and the medial aspect of the right heel. Neurological:      Mental Status: She is alert and oriented to person, place, and time. Deep Tendon Reflexes: Babinski sign absent on the right side. Babinski sign absent on the left side. Reflex Scores:       Patellar reflexes are 1+ on the right side and 1+ on the left side. Achilles reflexes are 0 on the right side and 0 on the left side. Comments: No ankle clonus noted bilaterally. Positive Tinel's sign noted with percussion of the right saphenous nerve, superficial peroneal nerve and tibial nerve. Positive Tinel's sign noted with percussion of the left common peroneal nerve, superficial peroneal nerve, deep peroneal nerve, and tibial nerve. Vibratory sensation is intact bilaterally. MRI right rear foot:        Impression   Mild distal insertional posterior tibial tendinosis. Mild sprain of the superomedial bundle of the spring ligament. Mild edema-like marrow signal is seen at the medial aspect of the talar head,   adjacent to the spring ligament. Nonspecific mild subchondral edema-like marrow signal at the medial talar   dome. Assessment:      Diagnosis Orders   1. Right foot pain  NH PNEUMA/VAC WALK BOOT PRE OTS      2. Left foot pain        3. Posterior tibial tendon dysfunction (PTTD) of right lower extremity  NH PNEUMA/VAC WALK BOOT PRE OTS      4.  Other synovitis and tenosynovitis, right ankle and foot  NH PNEUMA/VAC WALK BOOT PRE OTS      5. Neuritis of right foot        6. Neuritis of left foot            Plan:     MRI results were reviewed with the patient. I have recommended temporary immobilization in a fracture boot. Patient will require the boot from 2 weeks to 3 months depending on response to treatment. Patient instructed to wear the boot at all times standing walking, she may remove to bathe and while sleeping. Patient is instructed to continue physical therapy. I have prescribed a Medrol Dosepak, patient instructed to hold any NSAIDs until she completes the [address]. Patient instructed to call/return to clinic sooner should any problems arise. Orders Placed This Encounter   Medications    methylPREDNISolone (MEDROL DOSEPACK) 4 MG tablet     Sig: Take by mouth. Dispense:  1 kit     Refill:  0       Orders Placed This Encounter   Procedures    ND PNEUMA/VAC WALK BOOT PRE OTS     Length of need: 3 months  NPI: [id number]     Standing Status:   Future     Standing Expiration Date:   5/28/2023           Follow up:  Return in about 4 weeks (around 3/28/2023). Ada Finn DPM      Level of medical decision making: low. Please note that this report has been partially produced using speech recognition software which may cause errors including grammar, punctuation, and spelling or words and phrases that may seem inappropriate. If there are questions or concerns please feel free to contact me for clarification.

## 2023-03-03 ENCOUNTER — HOSPITAL ENCOUNTER (OUTPATIENT)
Dept: PHYSICAL THERAPY | Age: 53
Setting detail: THERAPIES SERIES
Discharge: HOME | End: 2023-03-03

## 2023-03-03 NOTE — PROGRESS NOTES
Therapy                            Cancellation/No-show Note      Date: 2023  Patient: Yenny Bagley (27 y.o. female)  : 1970  MRN:  [id number]  Referring Physician: Georgia Claudio MD    Medical Diagnosis: Chronic pain of both ankles [M25.571, G89.29, M25.572]      Visit Information:  Visits to Date 4   No Show/Cancelled Appts: 1 / 3      For today's appointment patient:  [x]  Cancelled  []  Rescheduled appointment  []  No-show   []  Called pt to remind of next appointment     Reason given by patient:  []  Patient ill  [x]  Conflicting appointment  []  No transportation    []  Conflict with work  []  No reason given  []  Other:      [x] Pt has future appointments scheduled, no follow up needed  [] Pt requests to be on hold.     Reason:   If > 2 weeks please discuss with therapist.  [] Therapist to call pt for follow up     Comments:       Signature: Electronically signed by Jj Denise PTA on 3/3/23 at 9:23 AM EST

## 2023-03-17 ENCOUNTER — HOSPITAL ENCOUNTER (OUTPATIENT)
Dept: PHYSICAL THERAPY | Age: 53
Setting detail: THERAPIES SERIES
Discharge: HOME | End: 2023-03-17

## 2023-03-17 NOTE — PROGRESS NOTES
Therapy                            Cancellation/No-show Note      Date: 2023  Patient: Dominique Mejia (42 y.o. female)  : 1970  MRN:  [id number]  Referring Physician: Trini Marinelli MD    Medical Diagnosis: Chronic pain of both ankles [M25.571, G89.29, M25.572]      Visit Information:  Visits to Date 4   No Show/Cancelled Appts:       For today's appointment patient:  [x]  Cancelled  []  Rescheduled appointment  []  No-show   []  Called pt to remind of next appointment     Reason given by patient:  []  Patient ill  []  Conflicting appointment  []  No transportation    []  Conflict with work  []  No reason given  [x]  Other:  Wishes to be discharged this date. \"Dealing with a lot,\" per pt. [] Pt has future appointments scheduled, no follow up needed  [] Pt requests to be on hold.     Reason:   If > 2 weeks please discuss with therapist.  [] Therapist to call pt for follow up     Comments:   D/C this visit    Signature: Electronically signed by Renaldo Zapata PTA on 3/17/23 at 10:55 AM EDT

## 2023-03-20 ENCOUNTER — TELEPHONE (OUTPATIENT)
Dept: PODIATRY | Age: 53
End: 2023-03-20

## 2023-04-28 ENCOUNTER — OFFICE VISIT (OUTPATIENT)
Dept: PAIN MANAGEMENT | Age: 53
End: 2023-04-28
Payer: COMMERCIAL

## 2023-04-28 ENCOUNTER — OFFICE VISIT (OUTPATIENT)
Dept: PODIATRY | Age: 53
End: 2023-04-28

## 2023-04-28 VITALS — WEIGHT: 165 LBS | HEIGHT: 67 IN | TEMPERATURE: 97.1 F | BODY MASS INDEX: 25.9 KG/M2

## 2023-04-28 VITALS
WEIGHT: 165 LBS | BODY MASS INDEX: 25.9 KG/M2 | SYSTOLIC BLOOD PRESSURE: 128 MMHG | HEIGHT: 67 IN | TEMPERATURE: 97.7 F | DIASTOLIC BLOOD PRESSURE: 82 MMHG

## 2023-04-28 DIAGNOSIS — G89.29 CHRONIC PAIN OF BOTH ANKLES: Primary | ICD-10-CM

## 2023-04-28 DIAGNOSIS — M79.672 PAIN IN BOTH FEET: ICD-10-CM

## 2023-04-28 DIAGNOSIS — M79.671 PAIN IN BOTH FEET: ICD-10-CM

## 2023-04-28 DIAGNOSIS — M25.571 CHRONIC PAIN OF BOTH ANKLES: Primary | ICD-10-CM

## 2023-04-28 DIAGNOSIS — M76.821 POSTERIOR TIBIAL TENDON DYSFUNCTION (PTTD) OF RIGHT LOWER EXTREMITY: ICD-10-CM

## 2023-04-28 DIAGNOSIS — M79.671 RIGHT FOOT PAIN: Primary | ICD-10-CM

## 2023-04-28 DIAGNOSIS — M79.672 LEFT FOOT PAIN: ICD-10-CM

## 2023-04-28 DIAGNOSIS — G57.93 PERIPHERAL NEURITIS OF BOTH FEET: ICD-10-CM

## 2023-04-28 DIAGNOSIS — M25.572 CHRONIC PAIN OF BOTH ANKLES: Primary | ICD-10-CM

## 2023-04-28 PROCEDURE — 3017F COLORECTAL CA SCREEN DOC REV: CPT | Performed by: NURSE PRACTITIONER

## 2023-04-28 PROCEDURE — 4004F PT TOBACCO SCREEN RCVD TLK: CPT | Performed by: NURSE PRACTITIONER

## 2023-04-28 PROCEDURE — G8419 CALC BMI OUT NRM PARAM NOF/U: HCPCS | Performed by: NURSE PRACTITIONER

## 2023-04-28 PROCEDURE — G8427 DOCREV CUR MEDS BY ELIG CLIN: HCPCS | Performed by: NURSE PRACTITIONER

## 2023-04-28 PROCEDURE — 99214 OFFICE O/P EST MOD 30 MIN: CPT | Performed by: NURSE PRACTITIONER

## 2023-04-28 RX ORDER — IBUPROFEN 800 MG/1
800 TABLET ORAL 2 TIMES DAILY PRN
Qty: 60 TABLET | Refills: 0 | Status: SHIPPED | OUTPATIENT
Start: 2023-04-28

## 2023-04-28 ASSESSMENT — ENCOUNTER SYMPTOMS
SORE THROAT: 0
ABDOMINAL PAIN: 0
SHORTNESS OF BREATH: 0

## 2023-04-28 NOTE — PROGRESS NOTES
Jaime Quiles  (1970)    4/28/2023    Subjective:     Jaime Quiles is 48 y.o. female who complains today of:    Chief Complaint   Patient presents with    Follow-up    Generalized Body Aches    Ankle Pain         Allergies:  Demerol, Meperidine, and Sulfa antibiotics    Past Medical History:   Diagnosis Date    Bipolar 1 disorder ([facility] [address] 6/29/2016    Hemangioma 2016    ct abdomen, left lobe    IBS (irritable bowel syndrome)     Liver lesion, right lobe 2007, 2016    stable between 2 readings    Thoracic back pain      Past Surgical History:   Procedure Laterality Date    APPENDECTOMY  6/10/16    laparoscopic Dr José Miguel Frederick  06/27/2016    w/bx     CYST REMOVAL      DENTAL SURGERY      NECK SURGERY      TUBAL LIGATION       Family History   Problem Relation Age of Onset    Breast Cancer Paternal Aunt      Social History     Socioeconomic History    Marital status:      Spouse name: Not on file    Number of children: Not on file    Years of education: Not on file    Highest education level: Not on file   Occupational History    Not on file   Tobacco Use    Smoking status: Every Day     Packs/day: 1.00     Years: 34.00     Pack years: 34.00     Types: Cigarettes    Smokeless tobacco: Never   Substance and Sexual Activity    Alcohol use: Yes     Comment: occasionally    Drug use: Yes     Types: Marijuana Nan Joseph)    Sexual activity: Not Currently     Partners: Male   Other Topics Concern    Not on file   Social History Narrative    Not on file     Social Determinants of Health     Financial Resource Strain: Low Risk     Difficulty of Paying Living Expenses: Not hard at all   Food Insecurity: No Food Insecurity    Worried About Running Out of Food in the Last Year: Never true    Ran Out of Food in the Last Year: Never true   Transportation Needs: Not on file   Physical Activity: Not on file   Stress: Not on file   Social Connections: Not on file   Intimate

## 2023-04-29 ASSESSMENT — ENCOUNTER SYMPTOMS
NAUSEA: 0
SHORTNESS OF BREATH: 0
VOMITING: 0
BACK PAIN: 1

## 2023-04-29 NOTE — PROGRESS NOTES
[address] [facility]  [address] [place] [address]  Dept: [phone]  Loc: [phone]       Kelley Samaniego  (1970)    4/29/23    Subjective     Kelley Samaniego is 48 y.o. female who complains today of:    Chief Complaint   Patient presents with    Foot Pain     Both feet       HPI: Patient presents for follow-up for bilateral foot pain and right foot posterior tibial tendon dysfunction. Patient states she received the fracture boot for the right lower extremity about a week and a half ago. Patient has been wearing at all times standing walking. Patient states she has been continue to perform the exercises, and she has been icing. Patient denies significant improvement. Patient reports continued neuropathic pain bilaterally. Review of Systems   Constitutional:  Negative for chills and fever. HENT:  Negative for hearing loss. Respiratory:  Negative for shortness of breath. Cardiovascular:  Negative for chest pain. Gastrointestinal:  Negative for nausea and vomiting. Genitourinary:  Negative for difficulty urinating. Musculoskeletal:  Positive for back pain and gait problem. Skin:  Negative for wound. Neurological:  Positive for numbness. Hematological:  Does not bruise/bleed easily. Psychiatric/Behavioral:  Negative for sleep disturbance. Allergies:  Demerol, Meperidine, and Sulfa antibiotics    Current Outpatient Medications on File Prior to Visit   Medication Sig Dispense Refill    lidocaine (LMX) 4 % cream Apply a half dollar sized amount to intact skin topically up to twice daily as needed for pain 45 g 1    diclofenac sodium (VOLTAREN) 1 % GEL Apply 4 g topically 4 times daily 100 g 5    DULoxetine (CYMBALTA) 60 MG extended release capsule Take 90 mg by mouth 2 times daily       No current facility-administered medications on file prior to visit.        Past

## 2023-06-27 ENCOUNTER — OFFICE VISIT (OUTPATIENT)
Dept: FAMILY MEDICINE CLINIC | Age: 53
End: 2023-06-27
Payer: COMMERCIAL

## 2023-06-27 ENCOUNTER — TELEPHONE (OUTPATIENT)
Dept: FAMILY MEDICINE CLINIC | Age: 53
End: 2023-06-27

## 2023-06-27 VITALS
OXYGEN SATURATION: 97 % | HEART RATE: 94 BPM | DIASTOLIC BLOOD PRESSURE: 80 MMHG | SYSTOLIC BLOOD PRESSURE: 120 MMHG | BODY MASS INDEX: 25.9 KG/M2 | TEMPERATURE: 97 F | WEIGHT: 165 LBS | HEIGHT: 67 IN

## 2023-06-27 DIAGNOSIS — K58.2 IRRITABLE BOWEL SYNDROME WITH BOTH CONSTIPATION AND DIARRHEA: ICD-10-CM

## 2023-06-27 DIAGNOSIS — R14.0 BLOATING: ICD-10-CM

## 2023-06-27 DIAGNOSIS — J40 BRONCHITIS: ICD-10-CM

## 2023-06-27 DIAGNOSIS — M54.50 ACUTE LEFT-SIDED LOW BACK PAIN WITHOUT SCIATICA: Primary | ICD-10-CM

## 2023-06-27 DIAGNOSIS — J06.9 VIRAL URI: ICD-10-CM

## 2023-06-27 LAB
INFLUENZA A ANTIBODY: NEGATIVE
INFLUENZA B ANTIBODY: NEGATIVE
Lab: NORMAL
PERFORMING INSTRUMENT: NORMAL
QC PASS/FAIL: NORMAL
SARS-COV-2, POC: NORMAL

## 2023-06-27 PROCEDURE — 99214 OFFICE O/P EST MOD 30 MIN: CPT | Performed by: NURSE PRACTITIONER

## 2023-06-27 PROCEDURE — 3017F COLORECTAL CA SCREEN DOC REV: CPT | Performed by: NURSE PRACTITIONER

## 2023-06-27 PROCEDURE — 87804 INFLUENZA ASSAY W/OPTIC: CPT | Performed by: NURSE PRACTITIONER

## 2023-06-27 PROCEDURE — 4004F PT TOBACCO SCREEN RCVD TLK: CPT | Performed by: NURSE PRACTITIONER

## 2023-06-27 PROCEDURE — G8419 CALC BMI OUT NRM PARAM NOF/U: HCPCS | Performed by: NURSE PRACTITIONER

## 2023-06-27 PROCEDURE — 87426 SARSCOV CORONAVIRUS AG IA: CPT | Performed by: NURSE PRACTITIONER

## 2023-06-27 PROCEDURE — G8427 DOCREV CUR MEDS BY ELIG CLIN: HCPCS | Performed by: NURSE PRACTITIONER

## 2023-06-27 RX ORDER — DOXYCYCLINE HYCLATE 100 MG
100 TABLET ORAL 2 TIMES DAILY
Qty: 20 TABLET | Refills: 0 | Status: SHIPPED | OUTPATIENT
Start: 2023-06-27 | End: 2023-07-07

## 2023-06-27 RX ORDER — PREDNISONE 50 MG/1
50 TABLET ORAL DAILY
Qty: 5 TABLET | Refills: 0 | Status: SHIPPED | OUTPATIENT
Start: 2023-06-27 | End: 2023-07-02

## 2023-06-27 SDOH — ECONOMIC STABILITY: INCOME INSECURITY: HOW HARD IS IT FOR YOU TO PAY FOR THE VERY BASICS LIKE FOOD, HOUSING, MEDICAL CARE, AND HEATING?: NOT HARD AT ALL

## 2023-06-27 SDOH — ECONOMIC STABILITY: HOUSING INSECURITY
IN THE LAST 12 MONTHS, WAS THERE A TIME WHEN YOU DID NOT HAVE A STEADY PLACE TO SLEEP OR SLEPT IN A SHELTER (INCLUDING NOW)?: NO

## 2023-06-27 SDOH — ECONOMIC STABILITY: FOOD INSECURITY: WITHIN THE PAST 12 MONTHS, YOU WORRIED THAT YOUR FOOD WOULD RUN OUT BEFORE YOU GOT MONEY TO BUY MORE.: NEVER TRUE

## 2023-06-27 SDOH — ECONOMIC STABILITY: FOOD INSECURITY: WITHIN THE PAST 12 MONTHS, THE FOOD YOU BOUGHT JUST DIDN'T LAST AND YOU DIDN'T HAVE MONEY TO GET MORE.: NEVER TRUE

## 2023-06-27 ASSESSMENT — PATIENT HEALTH QUESTIONNAIRE - PHQ9
SUM OF ALL RESPONSES TO PHQ QUESTIONS 1-9: 0
SUM OF ALL RESPONSES TO PHQ9 QUESTIONS 1 & 2: 0
8. MOVING OR SPEAKING SO SLOWLY THAT OTHER PEOPLE COULD HAVE NOTICED. OR THE OPPOSITE, BEING SO FIGETY OR RESTLESS THAT YOU HAVE BEEN MOVING AROUND A LOT MORE THAN USUAL: 0
SUM OF ALL RESPONSES TO PHQ QUESTIONS 1-9: 0
SUM OF ALL RESPONSES TO PHQ QUESTIONS 1-9: 0
5. POOR APPETITE OR OVEREATING: 0
1. LITTLE INTEREST OR PLEASURE IN DOING THINGS: 0
6. FEELING BAD ABOUT YOURSELF - OR THAT YOU ARE A FAILURE OR HAVE LET YOURSELF OR YOUR FAMILY DOWN: 0
SUM OF ALL RESPONSES TO PHQ QUESTIONS 1-9: 0
10. IF YOU CHECKED OFF ANY PROBLEMS, HOW DIFFICULT HAVE THESE PROBLEMS MADE IT FOR YOU TO DO YOUR WORK, TAKE CARE OF THINGS AT HOME, OR GET ALONG WITH OTHER PEOPLE: 0
7. TROUBLE CONCENTRATING ON THINGS, SUCH AS READING THE NEWSPAPER OR WATCHING TELEVISION: 0
3. TROUBLE FALLING OR STAYING ASLEEP: 0
9. THOUGHTS THAT YOU WOULD BE BETTER OFF DEAD, OR OF HURTING YOURSELF: 0
4. FEELING TIRED OR HAVING LITTLE ENERGY: 0
2. FEELING DOWN, DEPRESSED OR HOPELESS: 0

## 2023-06-27 ASSESSMENT — ENCOUNTER SYMPTOMS
VOMITING: 0
SHORTNESS OF BREATH: 1
RHINORRHEA: 0
SORE THROAT: 0
SINUS PRESSURE: 0
WHEEZING: 1
SINUS PAIN: 0
COUGH: 1

## 2023-06-28 ASSESSMENT — ENCOUNTER SYMPTOMS
CONSTIPATION: 1
NAUSEA: 1
DIARRHEA: 1

## 2023-06-29 ENCOUNTER — TELEPHONE (OUTPATIENT)
Dept: FAMILY MEDICINE CLINIC | Age: 53
End: 2023-06-29

## 2024-04-03 ENCOUNTER — OFFICE VISIT (OUTPATIENT)
Dept: FAMILY MEDICINE CLINIC | Age: 54
End: 2024-04-03
Payer: COMMERCIAL

## 2024-04-03 VITALS
WEIGHT: 180 LBS | HEART RATE: 78 BPM | OXYGEN SATURATION: 95 % | SYSTOLIC BLOOD PRESSURE: 114 MMHG | DIASTOLIC BLOOD PRESSURE: 78 MMHG | HEIGHT: 67 IN | BODY MASS INDEX: 28.25 KG/M2

## 2024-04-03 DIAGNOSIS — G89.29 CHRONIC BILATERAL LOW BACK PAIN WITH BILATERAL SCIATICA: Primary | ICD-10-CM

## 2024-04-03 DIAGNOSIS — M79.7 FIBROMYALGIA: ICD-10-CM

## 2024-04-03 DIAGNOSIS — K62.89 PERIRECTAL CYST: ICD-10-CM

## 2024-04-03 DIAGNOSIS — Z87.891 PERSONAL HISTORY OF TOBACCO USE: ICD-10-CM

## 2024-04-03 DIAGNOSIS — Z12.31 ENCOUNTER FOR SCREENING MAMMOGRAM FOR MALIGNANT NEOPLASM OF BREAST: ICD-10-CM

## 2024-04-03 DIAGNOSIS — M54.42 CHRONIC BILATERAL LOW BACK PAIN WITH BILATERAL SCIATICA: Primary | ICD-10-CM

## 2024-04-03 DIAGNOSIS — M25.50 POLYARTHRALGIA: ICD-10-CM

## 2024-04-03 DIAGNOSIS — M54.41 CHRONIC BILATERAL LOW BACK PAIN WITH BILATERAL SCIATICA: Primary | ICD-10-CM

## 2024-04-03 PROCEDURE — G8427 DOCREV CUR MEDS BY ELIG CLIN: HCPCS | Performed by: NURSE PRACTITIONER

## 2024-04-03 PROCEDURE — 99214 OFFICE O/P EST MOD 30 MIN: CPT | Performed by: NURSE PRACTITIONER

## 2024-04-03 PROCEDURE — G8419 CALC BMI OUT NRM PARAM NOF/U: HCPCS | Performed by: NURSE PRACTITIONER

## 2024-04-03 PROCEDURE — 3017F COLORECTAL CA SCREEN DOC REV: CPT | Performed by: NURSE PRACTITIONER

## 2024-04-03 PROCEDURE — G0296 VISIT TO DETERM LDCT ELIG: HCPCS | Performed by: NURSE PRACTITIONER

## 2024-04-03 PROCEDURE — 4004F PT TOBACCO SCREEN RCVD TLK: CPT | Performed by: NURSE PRACTITIONER

## 2024-04-03 RX ORDER — DOXYCYCLINE HYCLATE 100 MG
100 TABLET ORAL 2 TIMES DAILY
Qty: 20 TABLET | Refills: 0 | Status: SHIPPED | OUTPATIENT
Start: 2024-04-03 | End: 2024-04-13

## 2024-04-03 RX ORDER — HYDROXYZINE PAMOATE 50 MG/1
50 CAPSULE ORAL NIGHTLY
COMMUNITY
Start: 2024-04-02

## 2024-04-03 ASSESSMENT — PATIENT HEALTH QUESTIONNAIRE - PHQ9
4. FEELING TIRED OR HAVING LITTLE ENERGY: SEVERAL DAYS
9. THOUGHTS THAT YOU WOULD BE BETTER OFF DEAD, OR OF HURTING YOURSELF: NOT AT ALL
SUM OF ALL RESPONSES TO PHQ QUESTIONS 1-9: 3
2. FEELING DOWN, DEPRESSED OR HOPELESS: NOT AT ALL
SUM OF ALL RESPONSES TO PHQ9 QUESTIONS 1 & 2: 0
3. TROUBLE FALLING OR STAYING ASLEEP: SEVERAL DAYS
5. POOR APPETITE OR OVEREATING: NOT AT ALL
SUM OF ALL RESPONSES TO PHQ QUESTIONS 1-9: 3
10. IF YOU CHECKED OFF ANY PROBLEMS, HOW DIFFICULT HAVE THESE PROBLEMS MADE IT FOR YOU TO DO YOUR WORK, TAKE CARE OF THINGS AT HOME, OR GET ALONG WITH OTHER PEOPLE: NOT DIFFICULT AT ALL
6. FEELING BAD ABOUT YOURSELF - OR THAT YOU ARE A FAILURE OR HAVE LET YOURSELF OR YOUR FAMILY DOWN: NOT AT ALL
7. TROUBLE CONCENTRATING ON THINGS, SUCH AS READING THE NEWSPAPER OR WATCHING TELEVISION: NOT AT ALL
8. MOVING OR SPEAKING SO SLOWLY THAT OTHER PEOPLE COULD HAVE NOTICED. OR THE OPPOSITE, BEING SO FIGETY OR RESTLESS THAT YOU HAVE BEEN MOVING AROUND A LOT MORE THAN USUAL: SEVERAL DAYS
SUM OF ALL RESPONSES TO PHQ QUESTIONS 1-9: 3
SUM OF ALL RESPONSES TO PHQ QUESTIONS 1-9: 3
1. LITTLE INTEREST OR PLEASURE IN DOING THINGS: NOT AT ALL

## 2024-04-03 ASSESSMENT — ENCOUNTER SYMPTOMS
COUGH: 0
CONSTIPATION: 0
EYE PAIN: 0
CHEST TIGHTNESS: 0
DIARRHEA: 0
ABDOMINAL PAIN: 0
BACK PAIN: 1
COLOR CHANGE: 0
TROUBLE SWALLOWING: 0

## 2024-04-03 NOTE — PROGRESS NOTES
Discussed with the patient the current USPSTF guidelines released March 9, 2021 for screening for lung cancer.    For adults aged 50 to 80 years who have a 20 pack-year smoking history and currently smoke or have quit within the past 15 years the grade B recommendation is to:  Screen for lung cancer with low-dose computed tomography (LDCT) every year.  Stop screening once a person has not smoked for 15 years or has a health problem that limits life expectancy or the ability to have lung surgery.    The patient  reports that she has been smoking cigarettes. She has a 34.0 pack-year smoking history. She has never used smokeless tobacco.. Discussed with patient the risks and benefits of screening, including over-diagnosis, false positive rate, and total radiation exposure.  The patient currently exhibits no signs or symptoms suggestive of lung cancer.  Discussed with patient the importance of compliance with yearly annual lung cancer screenings and willingness to undergo diagnosis and treatment if screening scan is positive.  In addition, the patient was counseled regarding the importance of remaining smoke free and/or total smoking cessation.    Also reviewed the following if the patient has Medicare that as of February 10, 2022, Medicare only covers LDCT screening in patients aged 50-77 with at least a 20 pack-year smoking history who currently smoke or have quit in the last 15 years  
Skin is not jaundiced.      Findings: No erythema or lesion.   Neurological:      General: No focal deficit present.      Mental Status: She is alert and oriented to person, place, and time. Mental status is at baseline.      Cranial Nerves: No cranial nerve deficit.      Coordination: Coordination normal.      Gait: Gait normal.   Psychiatric:         Mood and Affect: Mood normal.         Behavior: Behavior normal.         Thought Content: Thought content normal.         Judgment: Judgment normal.         Assessment& Plan     Diagnosis Orders   1. Chronic bilateral low back pain with bilateral sciatica  [facility] - Sabino/Audi      2. Personal history of tobacco use  AK VISIT TO DISCUSS LUNG CA SCREEN W LDCT    CT lung screen [Initial/Annual]      3. Encounter for screening mammogram for malignant neoplasm of breast  GEOVANNA DIGITAL SCREEN W OR WO CAD BILATERAL      4. Polyarthralgia  Amb External Referral To Rheumatology      5. Fibromyalgia  Amb External Referral To Rheumatology      6. Perirectal cyst  doxycycline hyclate (VIBRA-TABS) 100 MG tablet    [facility], [place]        Referral to PT.  Referral to gen surg.  ATB, warm compresses.  Referral to rheum per patient request.  F/u if no improvement.  Side effects, adverse effects of the medication prescribed today, as well as treatment plan/ rationale and result expectations have been discussed with the patient who expresses understanding and desires to proceed.    Close follow up to evaluate treatment results and for coordination of care.  I have reviewed the patient's medical history in detail and updated the computerized patient record.    As always, patient is advised that if symptoms worsen in any way they will proceed to the nearest emergency room.       Orders Placed This Encounter   Procedures    GEOVANNA DIGITAL SCREEN W OR WO CAD BILATERAL     Standing Status:   Future     Standing Expiration Date:   6/3/2025    CT lung screen

## 2024-07-29 ENCOUNTER — OFFICE VISIT (OUTPATIENT)
Dept: FAMILY MEDICINE CLINIC | Age: 54
End: 2024-07-29

## 2024-07-29 VITALS
RESPIRATION RATE: 12 BRPM | SYSTOLIC BLOOD PRESSURE: 138 MMHG | WEIGHT: 180 LBS | DIASTOLIC BLOOD PRESSURE: 70 MMHG | HEIGHT: 67 IN | HEART RATE: 98 BPM | TEMPERATURE: 97.5 F | BODY MASS INDEX: 28.25 KG/M2 | OXYGEN SATURATION: 95 %
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
## 2024-07-29 DIAGNOSIS — M25.432 PAIN AND SWELLING OF LEFT WRIST: Primary | ICD-10-CM

## 2024-07-29 DIAGNOSIS — M25.532 PAIN AND SWELLING OF LEFT WRIST: Primary | ICD-10-CM

## 2024-07-29 RX ORDER — OXYCODONE HYDROCHLORIDE AND ACETAMINOPHEN 5; 325 MG/1; MG/1
1 TABLET ORAL EVERY 6 HOURS PRN
Qty: 12 TABLET | Refills: 0 | Status: ON HOLD | OUTPATIENT
Start: 2024-07-29 | End: 2024-08-02 | Stop reason: HOSPADM

## 2024-07-29 SDOH — ECONOMIC STABILITY: FOOD INSECURITY: WITHIN THE PAST 12 MONTHS, THE FOOD YOU BOUGHT JUST DIDN'T LAST AND YOU DIDN'T HAVE MONEY TO GET MORE.: NEVER TRUE

## 2024-07-29 SDOH — ECONOMIC STABILITY: FOOD INSECURITY: WITHIN THE PAST 12 MONTHS, YOU WORRIED THAT YOUR FOOD WOULD RUN OUT BEFORE YOU GOT MONEY TO BUY MORE.: NEVER TRUE

## 2024-07-29 SDOH — ECONOMIC STABILITY: INCOME INSECURITY: HOW HARD IS IT FOR YOU TO PAY FOR THE VERY BASICS LIKE FOOD, HOUSING, MEDICAL CARE, AND HEATING?: NOT HARD AT ALL

## 2024-07-29 ASSESSMENT — ENCOUNTER SYMPTOMS: COLOR CHANGE: 0

## 2024-07-29 NOTE — PROGRESS NOTES
Walk-In Clinic Encounter  Subjective   SUBJECTIVE    CHIEF COMPLAINT:   Chief Complaint   Patient presents with    Wrist Injury     Patient present today with a wrist injury from a fall yesterday with pain, swollen, limited movement and fingers are painful. She tried ibuprofen with little relief.       HPI:  Lorene Pina is a 54 y.o. female who presents as an established patient to the walk-in clinic today for:     Wrist Injury   The incident occurred 12 to 24 hours ago. The incident occurred at home. The injury mechanism was a fall. The pain is present in the left wrist. The quality of the pain is described as aching. The pain radiates to the left hand. The pain is moderate. The pain has been Fluctuating since the incident. Associated symptoms include muscle weakness. Pertinent negatives include no numbness or tingling. The symptoms are aggravated by movement and palpation. She has tried ice and NSAIDs for the symptoms. The treatment provided mild relief.     Fall last night at bedtime trying to catch cat. Not sure how she fell on her arm as she was focused on the cat.    Past Medical History:   Diagnosis Date    Bipolar 1 disorder (HCC) 6/29/2016    Hemangioma 2016    ct abdomen, left lobe    IBS (irritable bowel syndrome)     Liver lesion, right lobe 2007, 2016    stable between 2 readings    Thoracic back pain        Current Outpatient Medications on File Prior to Visit   Medication Sig Dispense Refill    hydrOXYzine pamoate (VISTARIL) 50 MG capsule Take 1 capsule by mouth nightly      DULoxetine (CYMBALTA) 60 MG extended release capsule Take 90 mg by mouth 2 times daily       No current facility-administered medications on file prior to visit.       Allergies   Allergen Reactions    Demerol     Meperidine Itching    Sulfa Antibiotics        Review of Systems   Constitutional:  Negative for chills, fatigue and fever.   Musculoskeletal:  Positive for joint swelling and myalgias.   Skin:  Negative for color

## 2024-07-30 ENCOUNTER — TELEPHONE (OUTPATIENT)
Dept: FAMILY MEDICINE CLINIC | Age: 54
End: 2024-07-30
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
## 2024-07-30 RX ORDER — AMOXICILLIN AND CLAVULANATE POTASSIUM 875; 125 MG/1; MG/1
1 TABLET, FILM COATED ORAL 2 TIMES DAILY
Qty: 14 TABLET | Refills: 0 | Status: SHIPPED | OUTPATIENT
Start: 2024-07-30 | End: 2024-08-06

## 2024-07-30 NOTE — TELEPHONE ENCOUNTER
Patient was seen yesterday at the walk-in for left wrist pain because the cat she was holding bit her, and she fell.     She just removed the wrist splint and noticed pus coming from the area she was bitten at so she is inquiring if an antibiotic can be prescribed.

## 2024-07-31 ENCOUNTER — APPOINTMENT (OUTPATIENT)
Dept: MRI IMAGING | Age: 54
End: 2024-07-31
Payer: COMMERCIAL

## 2024-07-31 ENCOUNTER — OFFICE VISIT (OUTPATIENT)
Dept: ORTHOPEDIC SURGERY | Age: 54
End: 2024-07-31
Payer: COMMERCIAL

## 2024-07-31 ENCOUNTER — HOSPITAL ENCOUNTER (INPATIENT)
Age: 54
LOS: 3 days | Discharge: HOME | End: 2024-08-03
Attending: INTERNAL MEDICINE | Admitting: INTERNAL MEDICINE
Payer: COMMERCIAL

## 2024-07-31 VITALS
TEMPERATURE: 97.3 F | WEIGHT: 180 LBS | HEIGHT: 67 IN | OXYGEN SATURATION: 94 % | BODY MASS INDEX: 28.25 KG/M2 | HEART RATE: 82 BPM

## 2024-07-31 DIAGNOSIS — S51.852A CAT BITE OF LEFT FOREARM, INITIAL ENCOUNTER: Primary | ICD-10-CM

## 2024-07-31 DIAGNOSIS — M65.9 SYNOVITIS AND TENOSYNOVITIS: Primary | ICD-10-CM
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
## 2024-07-31 PROBLEM — L03.114 CELLULITIS OF LEFT HAND: Status: ACTIVE | Noted: 2024-07-31

## 2024-07-31 LAB
ALBUMIN SERPL-MCNC: 4.6 G/DL (ref 3.5–4.6)
ALP SERPL-CCNC: 73 U/L (ref 40–130)
ALT SERPL-CCNC: 17 U/L (ref 0–33)
ANION GAP SERPL CALCULATED.3IONS-SCNC: 15 MEQ/L (ref 9–15)
AST SERPL-CCNC: 19 U/L (ref 0–35)
BASOPHILS # BLD: 0 K/UL (ref 0–0.2)
BASOPHILS NFR BLD: 0.3 %
BILIRUB SERPL-MCNC: 0.4 MG/DL (ref 0.2–0.7)
BUN SERPL-MCNC: 6 MG/DL (ref 6–20)
CALCIUM SERPL-MCNC: 9.4 MG/DL (ref 8.5–9.9)
CHLORIDE SERPL-SCNC: 103 MEQ/L (ref 95–107)
CO2 SERPL-SCNC: 23 MEQ/L (ref 20–31)
CREAT SERPL-MCNC: 0.54 MG/DL (ref 0.5–0.9)
EOSINOPHIL # BLD: 0.2 K/UL (ref 0–0.7)
EOSINOPHIL NFR BLD: 1.3 %
ERYTHROCYTE [DISTWIDTH] IN BLOOD BY AUTOMATED COUNT: 13.3 % (ref 11.5–14.5)
GLOBULIN SER CALC-MCNC: 2.6 G/DL (ref 2.3–3.5)
GLUCOSE SERPL-MCNC: 99 MG/DL (ref 70–99)
HCT VFR BLD AUTO: 41 % (ref 37–47)
HGB BLD-MCNC: 13.9 G/DL (ref 12–16)
LACTATE BLDV-SCNC: 0.8 MMOL/L (ref 0.5–2.2)
LYMPHOCYTES # BLD: 2.1 K/UL (ref 1–4.8)
LYMPHOCYTES NFR BLD: 18.4 %
MCH RBC QN AUTO: 30.8 PG (ref 27–31.3)
MCHC RBC AUTO-ENTMCNC: 33.9 % (ref 33–37)
MCV RBC AUTO: 90.7 FL (ref 79.4–94.8)
MONOCYTES # BLD: 0.7 K/UL (ref 0.2–0.8)
MONOCYTES NFR BLD: 6.3 %
NEUTROPHILS # BLD: 8.3 K/UL (ref 1.4–6.5)
NEUTS SEG NFR BLD: 73.3 %
PLATELET # BLD AUTO: 202 K/UL (ref 130–400)
POTASSIUM SERPL-SCNC: 3.6 MEQ/L (ref 3.4–4.9)
PROCALCITONIN SERPL IA-MCNC: <0.02 NG/ML (ref 0–0.15)
PROT SERPL-MCNC: 7.2 G/DL (ref 6.3–8)
RBC # BLD AUTO: 4.52 M/UL (ref 4.2–5.4)
SODIUM SERPL-SCNC: 141 MEQ/L (ref 135–144)
WBC # BLD AUTO: 11.3 K/UL (ref 4.8–10.8)

## 2024-07-31 PROCEDURE — 85025 COMPLETE CBC W/AUTO DIFF WBC: CPT

## 2024-07-31 PROCEDURE — 73218 MRI UPPER EXTREMITY W/O DYE: CPT

## 2024-07-31 PROCEDURE — 96365 THER/PROPH/DIAG IV INF INIT: CPT

## 2024-07-31 PROCEDURE — 2580000003 HC RX 258: Performed by: INTERNAL MEDICINE

## 2024-07-31 PROCEDURE — 4004F PT TOBACCO SCREEN RCVD TLK: CPT | Performed by: PHYSICIAN ASSISTANT

## 2024-07-31 PROCEDURE — 6370000000 HC RX 637 (ALT 250 FOR IP): Performed by: INTERNAL MEDICINE

## 2024-07-31 PROCEDURE — 6360000002 HC RX W HCPCS: Performed by: PHYSICIAN ASSISTANT

## 2024-07-31 PROCEDURE — 2580000003 HC RX 258: Performed by: PHYSICIAN ASSISTANT

## 2024-07-31 PROCEDURE — G8419 CALC BMI OUT NRM PARAM NOF/U: HCPCS | Performed by: PHYSICIAN ASSISTANT

## 2024-07-31 PROCEDURE — 99204 OFFICE O/P NEW MOD 45 MIN: CPT | Performed by: PHYSICIAN ASSISTANT

## 2024-07-31 PROCEDURE — 80053 COMPREHEN METABOLIC PANEL: CPT

## 2024-07-31 PROCEDURE — 1210000000 HC MED SURG R&B

## 2024-07-31 PROCEDURE — 84145 PROCALCITONIN (PCT): CPT

## 2024-07-31 PROCEDURE — G8427 DOCREV CUR MEDS BY ELIG CLIN: HCPCS | Performed by: PHYSICIAN ASSISTANT

## 2024-07-31 PROCEDURE — 83605 ASSAY OF LACTIC ACID: CPT

## 2024-07-31 PROCEDURE — 36415 COLL VENOUS BLD VENIPUNCTURE: CPT

## 2024-07-31 PROCEDURE — 3017F COLORECTAL CA SCREEN DOC REV: CPT | Performed by: PHYSICIAN ASSISTANT

## 2024-07-31 PROCEDURE — 87040 BLOOD CULTURE FOR BACTERIA: CPT

## 2024-07-31 PROCEDURE — 6360000002 HC RX W HCPCS: Performed by: INTERNAL MEDICINE

## 2024-07-31 PROCEDURE — 99285 EMERGENCY DEPT VISIT HI MDM: CPT

## 2024-07-31 RX ORDER — CIPROFLOXACIN 2 MG/ML
400 INJECTION, SOLUTION INTRAVENOUS ONCE
Status: COMPLETED | OUTPATIENT
Start: 2024-07-31 | End: 2024-07-31

## 2024-07-31 RX ORDER — AMOXICILLIN 500 MG/1
CAPSULE ORAL
Status: ON HOLD | COMMUNITY
Start: 2024-07-24 | End: 2024-08-02 | Stop reason: HOSPADM

## 2024-07-31 RX ORDER — ACETAMINOPHEN 325 MG/1
650 TABLET ORAL EVERY 6 HOURS PRN
Status: DISCONTINUED | OUTPATIENT
Start: 2024-07-31 | End: 2024-08-03 | Stop reason: HOSPADM

## 2024-07-31 RX ORDER — POTASSIUM CHLORIDE 7.45 MG/ML
10 INJECTION INTRAVENOUS PRN
Status: DISCONTINUED | OUTPATIENT
Start: 2024-07-31 | End: 2024-08-03 | Stop reason: HOSPADM

## 2024-07-31 RX ORDER — SODIUM CHLORIDE 0.9 % (FLUSH) 0.9 %
5-40 SYRINGE (ML) INJECTION PRN
Status: DISCONTINUED | OUTPATIENT
Start: 2024-07-31 | End: 2024-08-03 | Stop reason: HOSPADM

## 2024-07-31 RX ORDER — POLYETHYLENE GLYCOL 3350 17 G/17G
17 POWDER, FOR SOLUTION ORAL DAILY PRN
Status: DISCONTINUED | OUTPATIENT
Start: 2024-07-31 | End: 2024-08-03 | Stop reason: HOSPADM

## 2024-07-31 RX ORDER — MAGNESIUM SULFATE IN WATER 40 MG/ML
2000 INJECTION, SOLUTION INTRAVENOUS PRN
Status: DISCONTINUED | OUTPATIENT
Start: 2024-07-31 | End: 2024-08-03 | Stop reason: HOSPADM

## 2024-07-31 RX ORDER — SODIUM CHLORIDE 0.9 % (FLUSH) 0.9 %
5-40 SYRINGE (ML) INJECTION EVERY 12 HOURS SCHEDULED
Status: DISCONTINUED | OUTPATIENT
Start: 2024-07-31 | End: 2024-08-03 | Stop reason: HOSPADM

## 2024-07-31 RX ORDER — SODIUM CHLORIDE, SODIUM LACTATE, POTASSIUM CHLORIDE, CALCIUM CHLORIDE 600; 310; 30; 20 MG/100ML; MG/100ML; MG/100ML; MG/100ML
INJECTION, SOLUTION INTRAVENOUS CONTINUOUS
Status: DISPENSED | OUTPATIENT
Start: 2024-07-31 | End: 2024-08-01

## 2024-07-31 RX ORDER — SODIUM CHLORIDE 9 MG/ML
INJECTION, SOLUTION INTRAVENOUS PRN
Status: DISCONTINUED | OUTPATIENT
Start: 2024-07-31 | End: 2024-08-03 | Stop reason: HOSPADM

## 2024-07-31 RX ORDER — ACETAMINOPHEN 650 MG/1
650 SUPPOSITORY RECTAL EVERY 6 HOURS PRN
Status: DISCONTINUED | OUTPATIENT
Start: 2024-07-31 | End: 2024-08-03 | Stop reason: HOSPADM

## 2024-07-31 RX ORDER — ONDANSETRON 4 MG/1
4 TABLET, ORALLY DISINTEGRATING ORAL EVERY 8 HOURS PRN
Status: DISCONTINUED | OUTPATIENT
Start: 2024-07-31 | End: 2024-08-03 | Stop reason: HOSPADM

## 2024-07-31 RX ORDER — ENOXAPARIN SODIUM 100 MG/ML
40 INJECTION SUBCUTANEOUS DAILY
Status: DISCONTINUED | OUTPATIENT
Start: 2024-08-01 | End: 2024-08-03 | Stop reason: HOSPADM

## 2024-07-31 RX ORDER — IBUPROFEN 800 MG/1
TABLET ORAL
COMMUNITY
Start: 2024-07-24

## 2024-07-31 RX ORDER — ONDANSETRON 2 MG/ML
4 INJECTION INTRAMUSCULAR; INTRAVENOUS EVERY 6 HOURS PRN
Status: DISCONTINUED | OUTPATIENT
Start: 2024-07-31 | End: 2024-08-03 | Stop reason: HOSPADM

## 2024-07-31 RX ORDER — POTASSIUM CHLORIDE 20 MEQ/1
40 TABLET, EXTENDED RELEASE ORAL PRN
Status: DISCONTINUED | OUTPATIENT
Start: 2024-07-31 | End: 2024-08-03 | Stop reason: HOSPADM

## 2024-07-31 RX ADMIN — AMPICILLIN SODIUM AND SULBACTAM SODIUM 3000 MG: 2; 1 INJECTION, POWDER, FOR SOLUTION INTRAMUSCULAR; INTRAVENOUS at 15:51

## 2024-07-31 RX ADMIN — VANCOMYCIN HYDROCHLORIDE 1000 MG: 1 INJECTION, POWDER, LYOPHILIZED, FOR SOLUTION INTRAVENOUS at 20:31

## 2024-07-31 RX ADMIN — ACETAMINOPHEN 650 MG: 325 TABLET ORAL at 20:23

## 2024-07-31 RX ADMIN — CIPROFLOXACIN 400 MG: 400 INJECTION, SOLUTION INTRAVENOUS at 17:21

## 2024-07-31 RX ADMIN — SODIUM CHLORIDE, POTASSIUM CHLORIDE, SODIUM LACTATE AND CALCIUM CHLORIDE: 600; 310; 30; 20 INJECTION, SOLUTION INTRAVENOUS at 20:22

## 2024-07-31 RX ADMIN — AMPICILLIN SODIUM, SULBACTAM SODIUM 3000 MG: 2; 1 INJECTION, POWDER, FOR SOLUTION INTRAMUSCULAR; INTRAVENOUS at 23:39

## 2024-07-31 ASSESSMENT — ENCOUNTER SYMPTOMS
NAUSEA: 0
VOMITING: 0
VOICE CHANGE: 0
COLOR CHANGE: 1
SHORTNESS OF BREATH: 0
ANAL BLEEDING: 0
ABDOMINAL DISTENTION: 0
EYE DISCHARGE: 0

## 2024-07-31 ASSESSMENT — PAIN SCALES - GENERAL
PAINLEVEL_OUTOF10: 10
PAINLEVEL_OUTOF10: 5

## 2024-07-31 ASSESSMENT — PAIN - FUNCTIONAL ASSESSMENT: PAIN_FUNCTIONAL_ASSESSMENT: 0-10

## 2024-07-31 ASSESSMENT — PAIN DESCRIPTION - PAIN TYPE: TYPE: ACUTE PAIN

## 2024-07-31 ASSESSMENT — LIFESTYLE VARIABLES
HOW MANY STANDARD DRINKS CONTAINING ALCOHOL DO YOU HAVE ON A TYPICAL DAY: 1 OR 2
HOW OFTEN DO YOU HAVE A DRINK CONTAINING ALCOHOL: NEVER
HOW OFTEN DO YOU HAVE A DRINK CONTAINING ALCOHOL: MONTHLY OR LESS
HOW MANY STANDARD DRINKS CONTAINING ALCOHOL DO YOU HAVE ON A TYPICAL DAY: PATIENT DOES NOT DRINK

## 2024-07-31 ASSESSMENT — PAIN DESCRIPTION - ORIENTATION: ORIENTATION: LEFT

## 2024-07-31 ASSESSMENT — PAIN DESCRIPTION - LOCATION: LOCATION: ARM

## 2024-07-31 NOTE — PROGRESS NOTES
[facility]      Subjective:      Chief Complaint   Patient presents with    Follow-up     Pt presents here for left wrist pain after a fall last Sunday she states she is in 7/10 pain       HPI: Lorene Pina is a 54 y.o. female who is here from urgent care after a cat bite.  Was that her left nondominant arm.  She went in on Monday and there was a good deal of swelling.  When she got home from urgent care there became discharge from the puncture wounds which are on top and the bottom of the wrist.  She is increasing streaking up the arm.  She has increasing purulent discharge over the course last 24 hours.  She denies any fevers chills or night sweats.  She has significant difficulties flexing extending and pronating her hand.    Past Medical History:   Diagnosis Date    Bipolar 1 disorder (HCC) 6/29/2016    Hemangioma 2016    ct abdomen, left lobe    IBS (irritable bowel syndrome)     Liver lesion, right lobe 2007, 2016    stable between 2 readings    Thoracic back pain       Past Surgical History:   Procedure Laterality Date    APPENDECTOMY  6/10/16    laparoscopic Dr Magana    BREAST BIOPSY Right 1995    COLONOSCOPY  06/27/2016    w/bx     CYST REMOVAL      DENTAL SURGERY      NECK SURGERY      TUBAL LIGATION       Social History     Socioeconomic History    Marital status:      Spouse name: Not on file    Number of children: Not on file    Years of education: Not on file    Highest education level: Not on file   Occupational History    Not on file   Tobacco Use    Smoking status: Every Day     Current packs/day: 1.00     Average packs/day: 1 pack/day for 34.0 years (34.0 ttl pk-yrs)     Types: Cigarettes    Smokeless tobacco: Never   Substance and Sexual Activity    Alcohol use: Yes     Comment: occasionally    Drug use: Yes     Types: Marijuana (Weed)    Sexual activity: Not Currently     Partners: Male   Other Topics Concern    Not on file   Social  Psychotherapy Provided: Individual Psychotherapy 45 minutes     Length of time in session: 45 minutes, follow up in 1 month    Goals addressed in session: Goal 1 and Goal 2     Pain:      none    0    Current suicide risk : Low     NORAandry Mitchell presented for treatment with her Herlene Batters  She discussed the ongoing stress with their wedding as well as his recent contraction of lyme's disease  She also stated that others continue to try to "help" with the planning of the wedding and go against her wishes at times  Continuing to discuss ways for her to set and maintain boundaries  Also discussing ways for her to cope with the stress that this is generating  Reviewing and renewing her treatment plan  Giving supportive therapy  A- Progress - Continuing to process her emotions  P-Continue treatment    [address]: Diagnosis and Treatment Plan explained to Sara Londono relates understanding diagnosis and is agreeable to Treatment Plan   Yes

## 2024-07-31 NOTE — ED TRIAGE NOTES
Arrived with cat bite that happened Joshua evening  Pt states redness is increasing   Pt states Tuesday wound had increased drainage  Bite is on left wrist   Redness radiates up to let forearm   After pt was bit by cat and then fell landing on left wrist   Pt was seen in clinic and was told to come to ER for further eval and IV antibiotics

## 2024-07-31 NOTE — ED PROVIDER NOTES
[facility] ED  EMERGENCY DEPARTMENT ENCOUNTER      Pt Name: Lorene Pina  MRN: [id number]  Birthdate 1970  Date of evaluation: 7/31/2024  Provider: Jae Coronado PA-C  3:56 PM EDT    CHIEF COMPLAINT       Chief Complaint   Patient presents with    Animal Bite     Cat bite         HISTORY OF PRESENT ILLNESS   (Location/Symptom, Timing/Onset, Context/Setting, Quality, Duration, Modifying Factors, Severity)  Note limiting factors.   Lorene Pina is a 54 y.o. female who presents to the emergency department patient presents with 2-day history of cat bite occurred on Sunday its her cat.  Patient was seen by urgent care notes that she was already taking amoxicillin states she worsened.  Was seen at orthopedics they sent her over recommending IV antibiotics patient notes that the redness and swelling on her forearm worsened since last night.  Symptoms moderate severity.    HPI    Nursing Notes were reviewed.    REVIEW OF SYSTEMS    (2-9 systems for level 4, 10 or more for level 5)     Review of Systems   Constitutional:  Negative for chills and fever.   HENT:  Negative for ear discharge, nosebleeds and voice change.    Eyes:  Negative for discharge.   Respiratory:  Negative for shortness of breath.    Cardiovascular:  Negative for chest pain.   Gastrointestinal:  Negative for abdominal distention, anal bleeding, nausea and vomiting.   Genitourinary:  Negative for dysuria and hematuria.   Musculoskeletal:  Negative for joint swelling and neck pain.   Skin:  Positive for color change. Negative for pallor.   Neurological:  Negative for seizures and facial asymmetry.   Hematological:  Does not bruise/bleed easily.   Psychiatric/Behavioral:  Negative for behavioral problems, self-injury and sleep disturbance.    All other systems reviewed and are negative.      Except as noted above the remainder of the review of systems was reviewed and negative.       PAST MEDICAL HISTORY     Past Medical History:  normal. There is no distension.      Palpations: Abdomen is soft.      Tenderness: There is no abdominal tenderness.   Musculoskeletal:         General: Normal range of motion.      Cervical back: Normal range of motion and neck supple.   Skin:     General: Skin is warm.      Findings: Erythema present.             Comments: Patient has 2 puncture type wounds left wrist 1 volar 1 dorsal.  Patient has erythema volar surface from wrist to elbow.  See diagram.   Neurological:      Mental Status: She is alert and oriented to person, place, and time.         DIAGNOSTIC RESULTS     EKG: All EKG's are interpreted by the Emergency Department Physician who either signs or Co-signs this chart in the absence of a cardiologist.         RADIOLOGY:   Non-plain film images such as CT, Ultrasound and MRI are read by the radiologist. Plain radiographic images are visualized and preliminarily interpreted by the emergency physician with the below findings:         Interpretation per the Radiologist below, if available at the time of this note:    MRI HAND LEFT WO CONTRAST    (Results Pending)   MRI RADIUS ULNA LEFT WO CONTRAST    (Results Pending)         ED BEDSIDE ULTRASOUND:   Performed by ED Physician - none    LABS:  Labs Reviewed   CBC WITH AUTO DIFFERENTIAL - Abnormal; Notable for the following components:       Result Value    WBC 11.3 (*)     Neutrophils Absolute 8.3 (*)     All other components within normal limits   CULTURE, BLOOD 1   CULTURE, BLOOD 2   COMPREHENSIVE METABOLIC PANEL   LACTIC ACID   PROCALCITONIN       All other labs were within normal range or not returned as of this dictation.    EMERGENCY DEPARTMENT COURSE and DIFFERENTIAL DIAGNOSIS/MDM:   Vitals:    Vitals:    07/31/24 1501   BP: 128/72   Pulse: 76   Resp: 20   Temp: 97.9 °F (36.6 °C)   TempSrc: Oral   SpO2: 95%   Weight: 81.6 kg (180 lb)   Height: 1.702 m (5' 7\")            Medical Decision Making  presents to the emergency department patient presents

## 2024-07-31 NOTE — H&P
Hospital Medicine  History and Physical    Patient:  Lorene Pina  MRN: [id number]    CHIEF COMPLAINT:    Chief Complaint   Patient presents with    Animal Bite     Cat bite       History Obtained From:  Patient, EMR  Primary Care Physician: Erna Pond APRN - CNP    HISTORY OF PRESENT ILLNESS:   The patient is a 54 y.o. female with PMH of tobacco use disorder, chronic pain who presented with the above CC. Patient suffered a bite in her left wrist by her cat on Sunday that started getting worse progressively with pain, redness and swelling and purulent discharge, seen by Ortho today, sent to ED, IV Unasyn given, MRI of the left hand done, admitted for further management.    Past Medical History:      Diagnosis Date    Bipolar 1 disorder (HCC) 6/29/2016    Hemangioma 2016    ct abdomen, left lobe    IBS (irritable bowel syndrome)     Liver lesion, right lobe 2007, 2016    stable between 2 readings    Thoracic back pain        Past Surgical History:      Procedure Laterality Date    APPENDECTOMY  6/10/16    laparoscopic Dr Magana    BREAST BIOPSY Right 1995    COLONOSCOPY  06/27/2016    w/bx     CYST REMOVAL      DENTAL SURGERY      NECK SURGERY      TUBAL LIGATION         Medications Prior to Admission:    Prior to Admission medications    Medication Sig Start Date End Date Taking? Authorizing Provider   amoxicillin (AMOXIL) 500 MG capsule TAKE 1 CAPSULE EVERY 8 HOURS UNTIL GONE 7/24/24   ProviderNahun MD   ibuprofen (ADVIL;MOTRIN) 800 MG tablet TAKE 1 TABLET EVERY 8 HOURS AS NEEDED FOR PAIN 7/24/24   Provider, MD Nahun   amoxicillin-clavulanate (AUGMENTIN) 875-125 MG per tablet Take 1 tablet by mouth 2 times daily for 7 days 7/30/24 8/6/24  Arabella Harmon APRN - NP   oxyCODONE-acetaminophen (PERCOCET) 5-325 MG per tablet Take 1 tablet by mouth every 6 hours as needed for Pain for up to 3 days. Intended supply: 3 days. Take lowest dose possible to manage pain Max Daily

## 2024-07-31 NOTE — PLAN OF CARE
Problem: Pain  Goal: Verbalizes/displays adequate comfort level or baseline comfort level  Outcome: Progressing     Problem: Discharge Planning  Goal: Discharge to home or other facility with appropriate resources  Outcome: Progressing  Flowsheets (Taken 7/31/2024 2556)  Discharge to home or other facility with appropriate resources: Identify barriers to discharge with patient and caregiver

## 2024-08-01 PROBLEM — M65.90 SYNOVITIS AND TENOSYNOVITIS: Status: ACTIVE | Noted: 2024-08-01
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
## 2024-08-01 PROBLEM — M65.9 SYNOVITIS AND TENOSYNOVITIS: Status: ACTIVE | Noted: 2024-08-01

## 2024-08-01 LAB
ALBUMIN SERPL-MCNC: 3.6 G/DL (ref 3.5–4.6)
ANION GAP SERPL CALCULATED.3IONS-SCNC: 11 MEQ/L (ref 9–15)
BACTERIA BLD CULT ORG #2: NORMAL
BACTERIA BLD CULT: NORMAL
BASOPHILS # BLD: 0 K/UL (ref 0–0.2)
BASOPHILS NFR BLD: 0.4 %
BUN SERPL-MCNC: 8 MG/DL (ref 6–20)
CALCIUM SERPL-MCNC: 8.9 MG/DL (ref 8.5–9.9)
CHLORIDE SERPL-SCNC: 109 MEQ/L (ref 95–107)
CO2 SERPL-SCNC: 23 MEQ/L (ref 20–31)
CREAT SERPL-MCNC: 0.47 MG/DL (ref 0.5–0.9)
CRP SERPL HS-MCNC: 32.1 MG/L (ref 0–5)
EOSINOPHIL # BLD: 0.2 K/UL (ref 0–0.7)
EOSINOPHIL NFR BLD: 3.2 %
ERYTHROCYTE [DISTWIDTH] IN BLOOD BY AUTOMATED COUNT: 13.2 % (ref 11.5–14.5)
ERYTHROCYTE [SEDIMENTATION RATE] IN BLOOD BY WESTERGREN METHOD: 16 MM (ref 0–30)
GLUCOSE SERPL-MCNC: 98 MG/DL (ref 70–99)
HCT VFR BLD AUTO: 37.9 % (ref 37–47)
HGB BLD-MCNC: 12.8 G/DL (ref 12–16)
LYMPHOCYTES # BLD: 1.8 K/UL (ref 1–4.8)
LYMPHOCYTES NFR BLD: 25.5 %
MAGNESIUM SERPL-MCNC: 2 MG/DL (ref 1.7–2.4)
MCH RBC QN AUTO: 30.3 PG (ref 27–31.3)
MCHC RBC AUTO-ENTMCNC: 33.8 % (ref 33–37)
MCV RBC AUTO: 89.8 FL (ref 79.4–94.8)
MONOCYTES # BLD: 0.7 K/UL (ref 0.2–0.8)
MONOCYTES NFR BLD: 9.2 %
NEUTROPHILS # BLD: 4.4 K/UL (ref 1.4–6.5)
NEUTS SEG NFR BLD: 61.4 %
PHOSPHATE SERPL-MCNC: 3.5 MG/DL (ref 2.3–4.8)
PLATELET # BLD AUTO: 172 K/UL (ref 130–400)
POTASSIUM SERPL-SCNC: 3.4 MEQ/L (ref 3.4–4.9)
PROCALCITONIN SERPL IA-MCNC: <0.02 NG/ML (ref 0–0.15)
RBC # BLD AUTO: 4.22 M/UL (ref 4.2–5.4)
SODIUM SERPL-SCNC: 143 MEQ/L (ref 135–144)
WBC # BLD AUTO: 7.1 K/UL (ref 4.8–10.8)

## 2024-08-01 PROCEDURE — 84145 PROCALCITONIN (PCT): CPT

## 2024-08-01 PROCEDURE — 36415 COLL VENOUS BLD VENIPUNCTURE: CPT

## 2024-08-01 PROCEDURE — 85025 COMPLETE CBC W/AUTO DIFF WBC: CPT

## 2024-08-01 PROCEDURE — 85652 RBC SED RATE AUTOMATED: CPT

## 2024-08-01 PROCEDURE — 99252 IP/OBS CONSLTJ NEW/EST SF 35: CPT | Performed by: ORTHOPAEDIC SURGERY

## 2024-08-01 PROCEDURE — 6370000000 HC RX 637 (ALT 250 FOR IP): Performed by: INTERNAL MEDICINE

## 2024-08-01 PROCEDURE — 6360000002 HC RX W HCPCS: Performed by: INTERNAL MEDICINE

## 2024-08-01 PROCEDURE — 2580000003 HC RX 258: Performed by: INTERNAL MEDICINE

## 2024-08-01 PROCEDURE — 99252 IP/OBS CONSLTJ NEW/EST SF 35: CPT | Performed by: NURSE PRACTITIONER

## 2024-08-01 PROCEDURE — 99222 1ST HOSP IP/OBS MODERATE 55: CPT | Performed by: INTERNAL MEDICINE

## 2024-08-01 PROCEDURE — 80069 RENAL FUNCTION PANEL: CPT

## 2024-08-01 PROCEDURE — 83735 ASSAY OF MAGNESIUM: CPT

## 2024-08-01 PROCEDURE — 1210000000 HC MED SURG R&B

## 2024-08-01 PROCEDURE — 6360000002 HC RX W HCPCS

## 2024-08-01 PROCEDURE — 86140 C-REACTIVE PROTEIN: CPT

## 2024-08-01 RX ORDER — KETOROLAC TROMETHAMINE 15 MG/ML
15 INJECTION, SOLUTION INTRAMUSCULAR; INTRAVENOUS EVERY 6 HOURS PRN
Status: DISCONTINUED | OUTPATIENT
Start: 2024-08-01 | End: 2024-08-03 | Stop reason: HOSPADM

## 2024-08-01 RX ORDER — HYDROXYZINE PAMOATE 50 MG/1
50 CAPSULE ORAL NIGHTLY
Status: DISCONTINUED | OUTPATIENT
Start: 2024-08-01 | End: 2024-08-03 | Stop reason: HOSPADM

## 2024-08-01 RX ORDER — NICOTINE 21 MG/24HR
1 PATCH, TRANSDERMAL 24 HOURS TRANSDERMAL DAILY
Status: DISCONTINUED | OUTPATIENT
Start: 2024-08-01 | End: 2024-08-03 | Stop reason: HOSPADM

## 2024-08-01 RX ORDER — DULOXETIN HYDROCHLORIDE 60 MG/1
120 CAPSULE, DELAYED RELEASE ORAL DAILY
Status: DISCONTINUED | OUTPATIENT
Start: 2024-08-01 | End: 2024-08-03 | Stop reason: HOSPADM

## 2024-08-01 RX ADMIN — KETOROLAC TROMETHAMINE 15 MG: 15 INJECTION, SOLUTION INTRAMUSCULAR; INTRAVENOUS at 13:29

## 2024-08-01 RX ADMIN — AMPICILLIN SODIUM, SULBACTAM SODIUM 3000 MG: 2; 1 INJECTION, POWDER, FOR SOLUTION INTRAMUSCULAR; INTRAVENOUS at 23:25

## 2024-08-01 RX ADMIN — ACETAMINOPHEN 650 MG: 325 TABLET ORAL at 07:21

## 2024-08-01 RX ADMIN — DULOXETINE HYDROCHLORIDE 120 MG: 60 CAPSULE, DELAYED RELEASE ORAL at 10:58

## 2024-08-01 RX ADMIN — AMPICILLIN SODIUM, SULBACTAM SODIUM 3000 MG: 2; 1 INJECTION, POWDER, FOR SOLUTION INTRAMUSCULAR; INTRAVENOUS at 17:06

## 2024-08-01 RX ADMIN — AMPICILLIN SODIUM, SULBACTAM SODIUM 3000 MG: 2; 1 INJECTION, POWDER, FOR SOLUTION INTRAMUSCULAR; INTRAVENOUS at 10:58

## 2024-08-01 RX ADMIN — ACETAMINOPHEN 650 MG: 325 TABLET ORAL at 20:36

## 2024-08-01 RX ADMIN — AMPICILLIN SODIUM, SULBACTAM SODIUM 3000 MG: 2; 1 INJECTION, POWDER, FOR SOLUTION INTRAMUSCULAR; INTRAVENOUS at 05:35

## 2024-08-01 RX ADMIN — KETOROLAC TROMETHAMINE 15 MG: 15 INJECTION, SOLUTION INTRAMUSCULAR; INTRAVENOUS at 20:36

## 2024-08-01 RX ADMIN — KETOROLAC TROMETHAMINE 15 MG: 15 INJECTION, SOLUTION INTRAMUSCULAR; INTRAVENOUS at 00:59

## 2024-08-01 RX ADMIN — SODIUM CHLORIDE, PRESERVATIVE FREE 10 ML: 5 INJECTION INTRAVENOUS at 09:59

## 2024-08-01 RX ADMIN — KETOROLAC TROMETHAMINE 15 MG: 15 INJECTION, SOLUTION INTRAMUSCULAR; INTRAVENOUS at 07:21

## 2024-08-01 RX ADMIN — HYDROXYZINE PAMOATE 50 MG: 50 CAPSULE ORAL at 20:36

## 2024-08-01 ASSESSMENT — ENCOUNTER SYMPTOMS
ABDOMINAL PAIN: 0
NAUSEA: 0
SHORTNESS OF BREATH: 0
WHEEZING: 0
SORE THROAT: 0
EYE PAIN: 0
BACK PAIN: 0
PHOTOPHOBIA: 0
EYE REDNESS: 0
DIARRHEA: 0
VOMITING: 0
COUGH: 0

## 2024-08-01 ASSESSMENT — PAIN SCALES - GENERAL
PAINLEVEL_OUTOF10: 7
PAINLEVEL_OUTOF10: 9
PAINLEVEL_OUTOF10: 8
PAINLEVEL_OUTOF10: 5

## 2024-08-01 ASSESSMENT — PAIN DESCRIPTION - LOCATION: LOCATION: ARM

## 2024-08-01 ASSESSMENT — PAIN DESCRIPTION - ORIENTATION: ORIENTATION: LEFT

## 2024-08-01 ASSESSMENT — PAIN DESCRIPTION - DESCRIPTORS: DESCRIPTORS: SHARP

## 2024-08-01 NOTE — ACP (ADVANCE CARE PLANNING)
Advance Care Planning     Advance Care Planning Activator (Inpatient)  Conversation Note      Date of ACP Conversation: 7/31/2024     Conversation Conducted with: Patient with Decision Making Capacity    ACP Activator: Ling Wilson RN        Health Care Decision Maker:     Current Designated Health Care Decision Maker:     Primary Decision Maker: Shamir Amin [facility] [phone]

## 2024-08-01 NOTE — CARE COORDINATION
IV BENEFIT REQUEST FORM    FAX FROM: [place] [phone]    REQUESTED BY: Electronically signed by Shayy Shore RN on 2024 at 10:56 AM                                               RN/C3: PHONE: [phone])     DATE:/TIME OF REQUEST: 24  TIME: 10:56 AM      TO: [facility]      FAX TO: [phone]    PHONE: [phone]     THIS PATIENT HAS BEEN IDENTIFIED TO POSSIBLY NEED LONG TERM IV'S.    PLEASE CHECK INSURANCE COVERAGE FOR THE FOLLOWING PT/DRUGS.    PATIENT'S NAME: DREAD BAUTISTA                              [facility]   PATIENT'S : 1970  PATIENT ADDRESS: [address] [place] [address]  SSN:    ([id number])     PAYOR NAME:  [facility] Plan: [facility] MEDICAID / Product Type: *No Product type* /     DRUG: (UNASYN)                         DOSE: (3000MG)            FREQUENCY: Q (6) HR        __________ CHECK HERE IF PT HAS NO INSURANCE AND REQUESTING SELF PAY COST.    *IF [facility] IS NOT A PROVIDER FOR THIS PATIENT, PLEASE FORWARD INFO VIA FAX TO [facility] @ [phone],(PHONE NUMBER: [phone]) TO RUN BENEFIT VERIFICATION AND NOTIFY THE ABOVE C3 OF THIS PLAN.    (FAX FACE SHEET WITH DEMOGRAPHICS AND INSURANCE INFO WITH THIS FORM.)  PLEASE FAX BENEFIT INFO TO: THE [facility] HUB -[phone]    This message is intended only for the use of the individual or entity to which it is addressed and may contain information that is privileged, confidential, and exempt from disclosure under applicable law. If the reader of the notice is not intended recipient of the employee/agent responsible for delivering the message to the intended recipient, you are hereby notified than any dissemination, distribution or copying of this communication is strictly prohibited. Please contact the sender for further instructions on handling the information.

## 2024-08-01 NOTE — PLAN OF CARE
Problem: Discharge Planning  Goal: Discharge to home or other facility with appropriate resources  8/1/2024 1222 by Jackie Robbins, RN  Outcome: Progressing  8/1/2024 0208 by Sara Roche RN  Outcome: Progressing     Problem: Pain  Goal: Verbalizes/displays adequate comfort level or baseline comfort level  8/1/2024 1222 by Jackie Robbins, RN  Outcome: Progressing  8/1/2024 0208 by Sara Roche RN  Outcome: Progressing     Problem: Safety - Adult  Goal: Free from fall injury  Outcome: Progressing

## 2024-08-01 NOTE — CARE COORDINATION
Case Management Assessment  Initial Evaluation    Date/Time of Evaluation: 7/31/2024 8:51 PM  Assessment Completed by: Ling Wilson RN    If patient is discharged prior to next notation, then this note serves as note for discharge by case management.    Patient Name: Lorene Pina                   YOB: 1970  Diagnosis: Synovitis and tenosynovitis [M65.9]  Cellulitis of left hand [L03.114]  Cat bite, initial encounter [W55.01XA]                   Date / Time: 7/31/2024  3:03 PM    Patient Admission Status: Inpatient   Readmission Risk (Low < 19, Mod (19-27), High > 27): Readmission Risk Score: 4.6    Current PCP: Erna Pond APRN - CNP  PCP verified by CM? (P) Yes    Chart Reviewed: Yes      History Provided by: (P) Patient  Patient Orientation: (P) Alert and Oriented, Person, Place, Situation, Self    Patient Cognition: (P) Alert    Hospitalization in the last 30 days (Readmission):  No    If yes, Readmission Assessment in  Navigator will be completed.    Advance Directives:      Code Status: Full Code   Patient's Primary Decision Maker is: (P) Legal Next of Kin (dad Shamir.)      Discharge Planning:    Patient lives with: (P) Alone Type of Home: (P) Trailer/Mobile Home  Primary Care Giver: (P) Self  Patient Support Systems include: (P) Parent   Current Financial resources: (P) Medicaid  Current community resources: (P) None  Current services prior to admission: (P) None            Current DME:              Type of Home Care services:  (P) None    ADLS  Prior functional level: (P) Independent in ADLs/IADLs  Current functional level: (P) Independent in ADLs/IADLs    PT AM-PAC:   /24  OT AM-PAC:   /24    Family can provide assistance at DC: (P) Yes  Would you like Case Management to discuss the discharge plan with any other family members/significant others, and if so, who? (P) No  Plans to Return to Present Housing: (P) Yes  Other Identified Issues/Barriers to RETURNING to current housing:  none  Potential Assistance needed at discharge: (P) Other (Comment) (potential for post d/c iv antibiotics.)            Potential DME:    Patient expects to discharge to: (P) Trailer/mobile home  Plan for transportation at discharge:      Financial    Payor: [employer] / Plan: [employer] MEDICA[place] / Product Type: *No Product type* /     Does insurance require precert for SNF: Yes    Potential assistance Purchasing Medications: (P) No  Meds-to-Beds request:        [id number] - [place], [place] - [address]  - P [phone] - F [phone]  [address] [facility] [place] [address]  Phone: [phone] Fax: [phone]    [place], [place] - [address] - P [phone] - F [phone]  [address] [facility] [place] [address]  Phone: [phone] Fax: [phone]    [id number] - [place], [place] - [address] - P [phone] - F [phone]  [address]  [place] [address]  Phone: [phone] Fax: [phone]      Notes:    Factors facilitating achievement of predicted outcomes: Family support, Motivated, Cooperative, Pleasant, and Good insight into deficits    Barriers to discharge: medical clearance, potential for post d/c iv antibiotics with [facility] services if needed.    Additional Case Management Notes: the patient lives alone. She denied using any durable medical equipment, being on home O2 or dialysis. She stated that she has difficulty affording her utility bills and was given information with various community resources. She denied any other d/c needs in ER.    The Plan for Transition of Care is related to the following treatment goals of Synovitis and tenosynovitis [M65.9]  Cellulitis of left hand [L03.114]  Cat bite, initial encounter [W55.01XA]    IF APPLICABLE: The Patient and/or patient representative Lorene and her family were provided with a choice of provider and agrees with the discharge plan. Freedom of choice list with basic dialogue that supports the

## 2024-08-01 NOTE — CONSULTS
Consult Note  Patient: Lorene Pina  Unit/Bed: W269/W269-01  YOB: 1970  MRN: [id number]  Acct: [id number]   Admitting Diagnosis: Synovitis and tenosynovitis [M65.9]  Cellulitis of left hand [L03.114]  Cat bite, initial encounter [W55.01XA]  Date:  7/31/2024  Hospital Day: 1    Complaint:  Cat bite on Sunday 7/28/24    History of Present Illness:  Pt states she got bit by her cat on Sunday- she went to urgent care on Monday and was seen- but not started on any medications- she called back on Tuesday as she states her wrist was more swollen and there was draining coming form the bite areas- she never received any notifications about antibiotics being ready from pharmacy until yesterday am- so she never had time pick them up or start them   She was seen by ortho in office- and was sent to ed for further eval and IV antibiotics   Pt is a smoker   She denies any systemic symptoms- she has not had fevers- she is able to make a fist- but she had difficulty with ROM of wrist    PMHx:  Past Medical History:   Diagnosis Date   • Bipolar 1 disorder (HCC) 6/29/2016   • Hemangioma 2016    ct abdomen, left lobe   • IBS (irritable bowel syndrome)    • Liver lesion, right lobe 2007, 2016    stable between 2 readings   • Thoracic back pain        PSHx:  Past Surgical History:   Procedure Laterality Date   • APPENDECTOMY  6/10/16    laparoscopic Dr Magana   • BREAST BIOPSY Right 1995   • COLONOSCOPY  06/27/2016    w/bx    • CYST REMOVAL     • DENTAL SURGERY     • NECK SURGERY     • TUBAL LIGATION         Social Hx:  Social History     Socioeconomic History   • Marital status:      Spouse name: None   • Number of children: None   • Years of education: None   • Highest education level: None   Tobacco Use   • Smoking status: Every Day     Current packs/day: 1.00     Average packs/day: 1 pack/day for 34.0 years (34.0 ttl pk-yrs)     Types: Cigarettes   • Smokeless tobacco: Never   Substance and Sexual  inoculation- and then down the forearm- with markings      Lymphadenopathy:      Cervical: No cervical adenopathy.   Skin:     General: Skin is warm and dry.      Coloration: Skin is not pale.      Findings: No erythema or rash.   Neurological:      Mental Status: She is alert and oriented to person, place, and time.      Cranial Nerves: No cranial nerve deficit.      Coordination: Coordination normal.       LABS:  CBC:   Lab Results   Component Value Date/Time    WBC 7.1 08/01/2024 05:12 AM    RBC 4.22 08/01/2024 05:12 AM    HGB 12.8 08/01/2024 05:12 AM    HCT 37.9 08/01/2024 05:12 AM    MCV 89.8 08/01/2024 05:12 AM    MCH 30.3 08/01/2024 05:12 AM    MCHC 33.8 08/01/2024 05:12 AM    RDW 13.2 08/01/2024 05:12 AM     08/01/2024 05:12 AM    MPV 8.3 04/03/2013 01:22 PM     CBC with Differential:    Lab Results   Component Value Date/Time    WBC 7.1 08/01/2024 05:12 AM    RBC 4.22 08/01/2024 05:12 AM    HGB 12.8 08/01/2024 05:12 AM    HCT 37.9 08/01/2024 05:12 AM     08/01/2024 05:12 AM    MCV 89.8 08/01/2024 05:12 AM    MCH 30.3 08/01/2024 05:12 AM    MCHC 33.8 08/01/2024 05:12 AM    RDW 13.2 08/01/2024 05:12 AM    LYMPHOPCT 25.5 08/01/2024 05:12 AM    MONOPCT 9.2 08/01/2024 05:12 AM    EOSPCT 3.2 08/01/2024 05:12 AM    BASOPCT 0.4 08/01/2024 05:12 AM    MONOSABS 0.7 08/01/2024 05:12 AM    LYMPHSABS 1.8 08/01/2024 05:12 AM    EOSABS 0.2 08/01/2024 05:12 AM    BASOSABS 0.0 08/01/2024 05:12 AM     CMP:    Lab Results   Component Value Date/Time     08/01/2024 05:12 AM    K 3.4 08/01/2024 05:12 AM     08/01/2024 05:12 AM    CO2 23 08/01/2024 05:12 AM    BUN 8 08/01/2024 05:12 AM    CREATININE 0.47 08/01/2024 05:12 AM    GFRAA >60.0 03/17/2022 08:57 AM    LABGLOM >90.0 08/01/2024 05:12 AM    GLUCOSE 98 08/01/2024 05:12 AM    CALCIUM 8.9 08/01/2024 05:12 AM    BILITOT 0.4 07/31/2024 03:31 PM    ALKPHOS 73 07/31/2024 03:31 PM    AST 19 07/31/2024 03:31 PM    ALT 17 07/31/2024 03:31 PM     BMP:

## 2024-08-01 NOTE — PROGRESS NOTES
Hospitalist Progress Note      PCP: Erna Pond, APRN - CNP    Date of Admission: 7/31/2024    Chief Complaint:  no acute events, afebrile, stable HD, feels better    Medications:  Reviewed    Infusion Medications    sodium chloride       Scheduled Medications    nicotine  1 patch TransDERmal Daily    hydrOXYzine pamoate  50 mg Oral Nightly    DULoxetine  120 mg Oral Daily    sodium chloride flush  5-40 mL IntraVENous 2 times per day    [Held by provider] enoxaparin  40 mg SubCUTAneous Daily    ampicillin-sulbactam  3,000 mg IntraVENous Q6H     PRN Meds: ketorolac, sodium chloride flush, sodium chloride, potassium chloride **OR** potassium alternative oral replacement **OR** potassium chloride, magnesium sulfate, ondansetron **OR** ondansetron, polyethylene glycol, acetaminophen **OR** acetaminophen      Intake/Output Summary (Last 24 hours) at 8/1/2024 1059  Last data filed at 7/31/2024 1640  Gross per 24 hour   Intake 100 ml   Output --   Net 100 ml       Exam:    /77   Pulse 69   Temp 98.6 °F (37 °C) (Oral)   Resp 18   Ht 1.702 m (5' 7\")   Wt 81.6 kg (180 lb)   LMP 07/14/2020   SpO2 97%   BMI 28.19 kg/m²     General appearance: alert, cooperative  Lungs: clear to auscultation bilaterally  Heart: S1/S2,RRR  Abdomen: soft, active BS  Extremities: left wrist / forearm erythema and edema are improving    Labs:   Recent Labs     07/31/24  1530 08/01/24  0512   WBC 11.3* 7.1   HGB 13.9 12.8   HCT 41.0 37.9    172     Recent Labs     07/31/24  1531 08/01/24  0512    143   K 3.6 3.4    109*   CO2 23 23   BUN 6 8   CREATININE 0.54 0.47*   CALCIUM 9.4 8.9   PHOS  --  3.5     Recent Labs     07/31/24  1531   AST 19   ALT 17   BILITOT 0.4   ALKPHOS 73     No results for input(s): \"INR\" in the last 72 hours.  No results for input(s): \"CKTOTAL\", \"TROPONINI\" in the last 72 hours.    Urinalysis:      Lab Results   Component Value Date/Time    NITRU Negative 05/14/2017 06:15 PM    WBCUA

## 2024-08-01 NOTE — FLOWSHEET NOTE
Pt states she takes cymbalta 120mg daily received order from dr keller to change ordere dose to pt home dose

## 2024-08-02 ENCOUNTER — TELEPHONE (OUTPATIENT)
Dept: ULTRASOUND IMAGING | Age: 54
End: 2024-08-02

## 2024-08-02 VITALS
BODY MASS INDEX: 28.25 KG/M2 | HEIGHT: 67 IN | OXYGEN SATURATION: 98 % | DIASTOLIC BLOOD PRESSURE: 67 MMHG | HEART RATE: 55 BPM | TEMPERATURE: 97.3 F | SYSTOLIC BLOOD PRESSURE: 134 MMHG | RESPIRATION RATE: 16 BRPM | WEIGHT: 180 LBS

## 2024-08-02 LAB
ALBUMIN SERPL-MCNC: 3.7 G/DL (ref 3.5–4.6)
ANION GAP SERPL CALCULATED.3IONS-SCNC: 9 MEQ/L (ref 9–15)
BASOPHILS # BLD: 0 K/UL (ref 0–0.2)
BASOPHILS NFR BLD: 0.5 %
BUN SERPL-MCNC: 11 MG/DL (ref 6–20)
CALCIUM SERPL-MCNC: 8.8 MG/DL (ref 8.5–9.9)
CHLORIDE SERPL-SCNC: 109 MEQ/L (ref 95–107)
CO2 SERPL-SCNC: 24 MEQ/L (ref 20–31)
CREAT SERPL-MCNC: 0.5 MG/DL (ref 0.5–0.9)
CRP SERPL HS-MCNC: 17.1 MG/L (ref 0–5)
EOSINOPHIL # BLD: 0.2 K/UL (ref 0–0.7)
EOSINOPHIL NFR BLD: 3.7 %
ERYTHROCYTE [DISTWIDTH] IN BLOOD BY AUTOMATED COUNT: 13 % (ref 11.5–14.5)
GLUCOSE SERPL-MCNC: 99 MG/DL (ref 70–99)
HCT VFR BLD AUTO: 39.5 % (ref 37–47)
HGB BLD-MCNC: 13.4 G/DL (ref 12–16)
LYMPHOCYTES # BLD: 1.6 K/UL (ref 1–4.8)
LYMPHOCYTES NFR BLD: 26.3 %
MAGNESIUM SERPL-MCNC: 2.1 MG/DL (ref 1.7–2.4)
MCH RBC QN AUTO: 30.3 PG (ref 27–31.3)
MCHC RBC AUTO-ENTMCNC: 33.9 % (ref 33–37)
MCV RBC AUTO: 89.4 FL (ref 79.4–94.8)
MONOCYTES # BLD: 0.6 K/UL (ref 0.2–0.8)
MONOCYTES NFR BLD: 9.2 %
NEUTROPHILS # BLD: 3.7 K/UL (ref 1.4–6.5)
NEUTS SEG NFR BLD: 59.8 %
PHOSPHATE SERPL-MCNC: 3.5 MG/DL (ref 2.3–4.8)
PLATELET # BLD AUTO: 179 K/UL (ref 130–400)
POTASSIUM SERPL-SCNC: 3.7 MEQ/L (ref 3.4–4.9)
RBC # BLD AUTO: 4.42 M/UL (ref 4.2–5.4)
SODIUM SERPL-SCNC: 142 MEQ/L (ref 135–144)
WBC # BLD AUTO: 6.2 K/UL (ref 4.8–10.8)

## 2024-08-02 PROCEDURE — 1210000000 HC MED SURG R&B

## 2024-08-02 PROCEDURE — 83735 ASSAY OF MAGNESIUM: CPT

## 2024-08-02 PROCEDURE — 6360000002 HC RX W HCPCS: Performed by: INTERNAL MEDICINE

## 2024-08-02 PROCEDURE — 36415 COLL VENOUS BLD VENIPUNCTURE: CPT

## 2024-08-02 PROCEDURE — 6360000002 HC RX W HCPCS

## 2024-08-02 PROCEDURE — 2580000003 HC RX 258: Performed by: INTERNAL MEDICINE

## 2024-08-02 PROCEDURE — 6370000000 HC RX 637 (ALT 250 FOR IP): Performed by: INTERNAL MEDICINE

## 2024-08-02 PROCEDURE — 99232 SBSQ HOSP IP/OBS MODERATE 35: CPT | Performed by: INTERNAL MEDICINE

## 2024-08-02 PROCEDURE — 86140 C-REACTIVE PROTEIN: CPT

## 2024-08-02 PROCEDURE — 80069 RENAL FUNCTION PANEL: CPT

## 2024-08-02 PROCEDURE — 85025 COMPLETE CBC W/AUTO DIFF WBC: CPT

## 2024-08-02 RX ADMIN — SODIUM CHLORIDE, PRESERVATIVE FREE 10 ML: 5 INJECTION INTRAVENOUS at 21:21

## 2024-08-02 RX ADMIN — HYDROXYZINE PAMOATE 50 MG: 50 CAPSULE ORAL at 21:19

## 2024-08-02 RX ADMIN — ACETAMINOPHEN 650 MG: 325 TABLET ORAL at 16:58

## 2024-08-02 RX ADMIN — KETOROLAC TROMETHAMINE 15 MG: 15 INJECTION, SOLUTION INTRAMUSCULAR; INTRAVENOUS at 16:58

## 2024-08-02 RX ADMIN — ACETAMINOPHEN 650 MG: 325 TABLET ORAL at 23:06

## 2024-08-02 RX ADMIN — AMPICILLIN SODIUM, SULBACTAM SODIUM 3000 MG: 2; 1 INJECTION, POWDER, FOR SOLUTION INTRAMUSCULAR; INTRAVENOUS at 23:12

## 2024-08-02 RX ADMIN — ACETAMINOPHEN 650 MG: 325 TABLET ORAL at 08:14

## 2024-08-02 RX ADMIN — SODIUM CHLORIDE, PRESERVATIVE FREE 10 ML: 5 INJECTION INTRAVENOUS at 08:14

## 2024-08-02 RX ADMIN — AMPICILLIN SODIUM, SULBACTAM SODIUM 3000 MG: 2; 1 INJECTION, POWDER, FOR SOLUTION INTRAMUSCULAR; INTRAVENOUS at 11:35

## 2024-08-02 RX ADMIN — AMPICILLIN SODIUM, SULBACTAM SODIUM 3000 MG: 2; 1 INJECTION, POWDER, FOR SOLUTION INTRAMUSCULAR; INTRAVENOUS at 17:04

## 2024-08-02 RX ADMIN — DULOXETINE HYDROCHLORIDE 120 MG: 60 CAPSULE, DELAYED RELEASE ORAL at 08:14

## 2024-08-02 RX ADMIN — KETOROLAC TROMETHAMINE 15 MG: 15 INJECTION, SOLUTION INTRAMUSCULAR; INTRAVENOUS at 23:05

## 2024-08-02 RX ADMIN — KETOROLAC TROMETHAMINE 15 MG: 15 INJECTION, SOLUTION INTRAMUSCULAR; INTRAVENOUS at 05:28

## 2024-08-02 RX ADMIN — AMPICILLIN SODIUM, SULBACTAM SODIUM 3000 MG: 2; 1 INJECTION, POWDER, FOR SOLUTION INTRAMUSCULAR; INTRAVENOUS at 05:27

## 2024-08-02 ASSESSMENT — PAIN DESCRIPTION - ORIENTATION
ORIENTATION: LEFT
ORIENTATION: LEFT

## 2024-08-02 ASSESSMENT — PAIN SCALES - GENERAL
PAINLEVEL_OUTOF10: 3
PAINLEVEL_OUTOF10: 7
PAINLEVEL_OUTOF10: 8
PAINLEVEL_OUTOF10: 3
PAINLEVEL_OUTOF10: 7
PAINLEVEL_OUTOF10: 8

## 2024-08-02 ASSESSMENT — PAIN DESCRIPTION - LOCATION
LOCATION: ARM
LOCATION: ARM

## 2024-08-02 ASSESSMENT — PAIN DESCRIPTION - DESCRIPTORS
DESCRIPTORS: SHARP
DESCRIPTORS: ACHING;STABBING

## 2024-08-02 NOTE — PROGRESS NOTES
Infectious Disease           History of Present Illness:   Patient suffered a bite in her left wrist by her cat several days ago.started getting worse progressively with pain, redness and swelling and purulent discharge, seen by Ortho  IV Unasyn given, Feels better overall but thinks his swelling might be a little bit worse is using ice on it.       Physical Exam:     Blood pressure 131/86, pulse 64, temperature 98 °F (36.7 °C), temperature source Oral, resp. rate 16, height 1.702 m (5' 7\"), weight 81.6 kg (180 lb), last menstrual period 07/14/2020, SpO2 94 %, not currently breastfeeding.  General: Patient appears ok at the present time. NAD  Skin: no new rashes, left hand swelling near wrist juncture. , 2 teeth marks from cat  HEENT:  Neck is supple, No subconjunctival hemorrhages, no oral exudates  Heart: S1 S2  Lungs: clear bilaterally   Abdomen: soft, ND, NTTP,   Back :no CVA tenderness  Extrem: No edema, non tender  Neuro exam: CN II-XII intact  Psych: cooperative    Labs:  I have reviewed all lab results by electronic record, including most recent CBC, metabolic panel, and pertinent abnormalities were addressed from an infectious disease perspective.  Trends are being monitored over time.   Lab Results   Component Value Date    WBC 6.2 08/02/2024    HGB 13.4 08/02/2024    HCT 39.5 08/02/2024    MCV 89.4 08/02/2024     08/02/2024     Lab Results   Component Value Date/Time     08/02/2024 05:48 AM    K 3.7 08/02/2024 05:48 AM     08/02/2024 05:48 AM    CO2 24 08/02/2024 05:48 AM    BUN 11 08/02/2024 05:48 AM    CREATININE 0.50 08/02/2024 05:48 AM    GLUCOSE 99 08/02/2024 05:48 AM    CALCIUM 8.8 08/02/2024 05:48 AM    LABGLOM >90.0 08/02/2024 05:48 AM        Radiology:  I have reviewed imaging results per electronic record and most pertinent abnormalities are being addressed from an infectious disease standpoint.             ASSESSMENT:  Cellulitis  Cat bite        PLAN:  Continue  Unasyn  Augmentin for discharge tomorrow if everything is okay 10 more days    Follow-up with us as outpatient

## 2024-08-02 NOTE — PROGRESS NOTES
DAILY PROGRESS NOTE      Hospital day 2 about 36 hr on antibiotics     Patient doing well  Vitals:    24 0146   BP: 120/79   Pulse: 55   Resp: 16   Temp: 97.3 °F (36.3 °C)   SpO2: 94%      Temp (24hrs), Av.2 °F (36.8 °C), Min:97.3 °F (36.3 °C), Max:98.6 °F (37 °C)       Pain Control Good  No chest pain or shortness of breath.  No calf pain    Exam:   Left wrist  shows neuro vascular status intact. Flexion and extension intact on extremity, no drainage, and redness improving  Calves soft and non-tender without evidence of DVT.  .      Labs reviewed:  CBC:   Recent Labs     24  1530 24  0512 24  0548   WBC 11.3* 7.1 6.2   HGB 13.9 12.8 13.4    172 179     BMP:    Recent Labs     24  1531 24  0512    143   K 3.6 3.4    109*   CO2 23 23   BUN 6 8   CREATININE 0.54 0.47*   GLUCOSE 99 98       I&O  I/O last 3 completed shifts:  In: 100 [IV Piggyback:100]  Out: -       Assessment:  Good hospital course- with desired effect of IV antibiotics   Marked redness improving   Left wrist joint movement ok- pt able to give thumbs up and continues to be amble to make a fist   Her WBC are improving, no systemic symptoms   Again her MRI were negative and clinical course is improvement   Form ortho no surgical intervention    We will leave pt info for hand follow up in case anything wound re- inflame or swelling and decreased ROM on po antibiotics   We spoke about symptoms    Pt verbalizes understanding   Ortho sign off         Plan:  as above   As needed hand follow up info in chart    LUZ Wright CNP   2024 6:39 AM

## 2024-08-02 NOTE — CARE COORDINATION
MET WITH PT AT BEDSIDE. DC PLAN REMAINS HOME, PT DENIES NEEDS- PENDING FINAL ID PLAN/REC.   IV CHECK SENT, PT % COVERAGE IF IV ABX NEEDED. WILL CONTINUE TO FOLLOW.

## 2024-08-02 NOTE — DISCHARGE SUMMARY
Hospital Medicine Discharge Summary    Lorene Pina  :  1970  MRN:  [id number]    Admit date:  2024  Discharge date:  8/3/2024    Admitting Physician:  Fercho Falk MD  Primary Care Physician:  Erna Pond, APRN - CNP      Discharge Diagnoses:    Principal Problem:    Cellulitis of left hand  Active Problems:    Synovitis and tenosynovitis    Cat bite  Resolved Problems:    * No resolved hospital problems. *      Hospital Course:   Lorene Pina is a 54 y.o. female that was admitted and treated at [facility] for the following medical issues:     Cat bite wound infection with cellulitis of the left wrist  - MRI was negative for osteomyelitis, tenosynovitis or abscess  - treated with IV Unasyn  - clinically improved  - switched to Augmentin on d/c  - followed by Ortho and ID       Disposition - home      Patient was seen by the following consultants while admitted to [facility]:   Consults:  IP CONSULT TO INFECTIOUS DISEASES  IP CONSULT TO ORTHOPEDIC SURGERY    Significant Diagnostic Studies:    MRI HAND LEFT WO CONTRAST    Result Date: 2024  EXAMINATION: MRI OF THE LEFT HAND WITHOUT CONTRAST; MRI OF THE LEFT FOREARM WITHOUT CONTRAST, 2024 5:59 pm; 2024 6:00 pm TECHNIQUE: Multiplanar multisequence MRI of the left hand was performed without the administration of intravenous contrast.; Multiplanar multisequence MRI of the left forearm was performed without the administration of intravenous contrast. COMPARISON: None HISTORY: ORDERING SYSTEM PROVIDED HISTORY: hand/forearm cellulitis/ cat bite TECHNOLOGIST PROVIDED HISTORY: Reason for exam:->hand/forearm cellulitis/ cat bite Decision Support Exception - unselect if not a suspected or confirmed emergency medical condition->Emergency Medical Condition (MA) What reading provider will be dictating this exam?->CRC; ORDERING SYSTEM PROVIDED HISTORY: cat bite / ? abscess ?tenosynovitis TECHNOLOGIST

## 2024-08-02 NOTE — CONSULTS
Infectious Disease     Patient Name: Lorene Pina  Date: 8/1/2024  YOB: 1970  Medical Record Number: [id number]        Chief Complaint   Patient presents with    Animal Bite     Cat bite          History of Present Illness:   Patient suffered a bite in her left wrist by her cat several days ago.started getting worse progressively with pain, redness and swelling and purulent discharge, seen by Ortho  IV Unasyn given, Feels better today. .       Review of Systems: All other ROS reviewed and are negative other than as stated in HPI            Social History     Tobacco Use    Smoking status: Every Day     Current packs/day: 1.00     Average packs/day: 1 pack/day for 34.0 years (34.0 ttl pk-yrs)     Types: Cigarettes    Smokeless tobacco: Never   Substance Use Topics    Alcohol use: Yes     Comment: occasionally    Drug use: Yes     Types: Marijuana (Weed)         Past Medical History:   Diagnosis Date    Bipolar 1 disorder (HCC) 6/29/2016    Hemangioma 2016    ct abdomen, left lobe    IBS (irritable bowel syndrome)     Liver lesion, right lobe 2007, 2016    stable between 2 readings    Thoracic back pain            Past Surgical History:   Procedure Laterality Date    APPENDECTOMY  6/10/16    laparoscopic Dr Magana    BREAST BIOPSY Right 1995    COLONOSCOPY  06/27/2016    w/bx     CYST REMOVAL      DENTAL SURGERY      NECK SURGERY      TUBAL LIGATION           No current facility-administered medications on file prior to encounter.     Current Outpatient Medications on File Prior to Encounter   Medication Sig Dispense Refill    amoxicillin (AMOXIL) 500 MG capsule TAKE 1 CAPSULE EVERY 8 HOURS UNTIL GONE (Patient not taking: Reported on 7/31/2024)      ibuprofen (ADVIL;MOTRIN) 800 MG tablet TAKE 1 TABLET EVERY 8 HOURS AS NEEDED FOR PAIN      amoxicillin-clavulanate (AUGMENTIN) 875-125 MG per tablet Take 1 tablet by mouth 2 times daily for 7 days (Patient not taking: Reported on 7/31/2024) 14  05:12 AM        Radiology:  I have reviewed imaging results per electronic record and most pertinent abnormalities are being addressed from an infectious disease standpoint.             ASSESSMENT:  Cellulitis  Cat bite        PLAN:  Continue Unasyn  Augmentin for discharge

## 2024-08-02 NOTE — PROGRESS NOTES
Hospitalist Progress Note      PCP: Erna Pond, APRN - CNP    Date of Admission: 7/31/2024    Chief Complaint:  no acute events, afebrile, stable HD, feels better    Medications:  Reviewed    Infusion Medications    sodium chloride       Scheduled Medications    nicotine  1 patch TransDERmal Daily    hydrOXYzine pamoate  50 mg Oral Nightly    DULoxetine  120 mg Oral Daily    sodium chloride flush  5-40 mL IntraVENous 2 times per day    [Held by provider] enoxaparin  40 mg SubCUTAneous Daily    ampicillin-sulbactam  3,000 mg IntraVENous Q6H     PRN Meds: ketorolac, sodium chloride flush, sodium chloride, potassium chloride **OR** potassium alternative oral replacement **OR** potassium chloride, magnesium sulfate, ondansetron **OR** ondansetron, polyethylene glycol, acetaminophen **OR** acetaminophen    No intake or output data in the 24 hours ending 08/02/24 1137      Exam:    /86   Pulse 64   Temp 98 °F (36.7 °C) (Oral)   Resp 16   Ht 1.702 m (5' 7\")   Wt 81.6 kg (180 lb)   LMP 07/14/2020   SpO2 94%   BMI 28.19 kg/m²     General appearance: alert, cooperative  Lungs: clear to auscultation bilaterally  Heart: S1/S2,RRR  Abdomen: soft, active BS  Extremities: left wrist / forearm erythema is improving    Labs:   Recent Labs     07/31/24  1530 08/01/24  0512 08/02/24  0548   WBC 11.3* 7.1 6.2   HGB 13.9 12.8 13.4   HCT 41.0 37.9 39.5    172 179       Recent Labs     07/31/24  1531 08/01/24  0512 08/02/24  0548    143 142   K 3.6 3.4 3.7    109* 109*   CO2 23 23 24   BUN 6 8 11   CREATININE 0.54 0.47* 0.50   CALCIUM 9.4 8.9 8.8   PHOS  --  3.5 3.5       Recent Labs     07/31/24  1531   AST 19   ALT 17   BILITOT 0.4   ALKPHOS 73       No results for input(s): \"INR\" in the last 72 hours.  No results for input(s): \"CKTOTAL\", \"TROPONINI\" in the last 72 hours.    Urinalysis:      Lab Results   Component Value Date/Time    NITRU Negative 05/14/2017 06:15 PM    WBCUA 6-10 05/14/2017  06:15 PM    BACTERIA Moderate 05/14/2017 06:15 PM    RBCUA 3-5 05/14/2017 06:15 PM    BLOODU SMALL 05/14/2017 06:15 PM    SPECGRAV 1.020 06/10/2016 12:00 AM    GLUCOSEU Negative 05/14/2017 06:15 PM       Radiology:  MRI RADIUS ULNA LEFT WO CONTRAST   Final Result   No evidence of osteomyelitis, tenosynovitis or abscess.         MRI HAND LEFT WO CONTRAST   Final Result   No evidence of osteomyelitis, tenosynovitis or abscess.                 Assessment/Plan:    54 y.o. female with PMH of tobacco use disorder, chronic pain who presented with:     Cat bite wound infection with cellulitis of the left wrist  - MRI was negative for osteomyelitis, tenosynovitis or abscess  - treated with IV Unasyn  - clinically improved  - switched to Augmentin on d/c  - followed by Ortho and ID     Tobacco use  - Nicotine patch    Chronic pain  - resumed Cymbalta        Diet: ADULT DIET; Regular    Code Status: Full Code      Disposition - home today if OK with ID          Electronically signed by HONG ARROYO MD on 8/2/2024 at 11:37 AM

## 2024-08-03 LAB
ALBUMIN SERPL-MCNC: 3.8 G/DL (ref 3.5–4.6)
ANION GAP SERPL CALCULATED.3IONS-SCNC: 11 MEQ/L (ref 9–15)
BASOPHILS # BLD: 0 K/UL (ref 0–0.2)
BASOPHILS NFR BLD: 0.6 %
BUN SERPL-MCNC: 15 MG/DL (ref 6–20)
CALCIUM SERPL-MCNC: 8.9 MG/DL (ref 8.5–9.9)
CHLORIDE SERPL-SCNC: 106 MEQ/L (ref 95–107)
CO2 SERPL-SCNC: 22 MEQ/L (ref 20–31)
CREAT SERPL-MCNC: 0.51 MG/DL (ref 0.5–0.9)
CRP SERPL HS-MCNC: 9.4 MG/L (ref 0–5)
EOSINOPHIL # BLD: 0.2 K/UL (ref 0–0.7)
EOSINOPHIL NFR BLD: 4.4 %
ERYTHROCYTE [DISTWIDTH] IN BLOOD BY AUTOMATED COUNT: 13 % (ref 11.5–14.5)
GLUCOSE SERPL-MCNC: 89 MG/DL (ref 70–99)
HCT VFR BLD AUTO: 37.1 % (ref 37–47)
HGB BLD-MCNC: 12.7 G/DL (ref 12–16)
LYMPHOCYTES # BLD: 1.6 K/UL (ref 1–4.8)
LYMPHOCYTES NFR BLD: 29 %
MAGNESIUM SERPL-MCNC: 2.2 MG/DL (ref 1.7–2.4)
MCH RBC QN AUTO: 30.3 PG (ref 27–31.3)
MCHC RBC AUTO-ENTMCNC: 34.2 % (ref 33–37)
MCV RBC AUTO: 88.5 FL (ref 79.4–94.8)
MONOCYTES # BLD: 0.5 K/UL (ref 0.2–0.8)
MONOCYTES NFR BLD: 9.2 %
NEUTROPHILS # BLD: 3.1 K/UL (ref 1.4–6.5)
NEUTS SEG NFR BLD: 56.4 %
PHOSPHATE SERPL-MCNC: 4.2 MG/DL (ref 2.3–4.8)
PLATELET # BLD AUTO: 196 K/UL (ref 130–400)
POTASSIUM SERPL-SCNC: 4 MEQ/L (ref 3.4–4.9)
RBC # BLD AUTO: 4.19 M/UL (ref 4.2–5.4)
SODIUM SERPL-SCNC: 139 MEQ/L (ref 135–144)
WBC # BLD AUTO: 5.5 K/UL (ref 4.8–10.8)

## 2024-08-03 PROCEDURE — 83735 ASSAY OF MAGNESIUM: CPT

## 2024-08-03 PROCEDURE — 36415 COLL VENOUS BLD VENIPUNCTURE: CPT

## 2024-08-03 PROCEDURE — 86140 C-REACTIVE PROTEIN: CPT

## 2024-08-03 PROCEDURE — 80069 RENAL FUNCTION PANEL: CPT

## 2024-08-03 PROCEDURE — 6360000002 HC RX W HCPCS: Performed by: INTERNAL MEDICINE

## 2024-08-03 PROCEDURE — 85025 COMPLETE CBC W/AUTO DIFF WBC: CPT

## 2024-08-03 PROCEDURE — 99232 SBSQ HOSP IP/OBS MODERATE 35: CPT | Performed by: INTERNAL MEDICINE

## 2024-08-03 PROCEDURE — 6360000002 HC RX W HCPCS

## 2024-08-03 PROCEDURE — 6370000000 HC RX 637 (ALT 250 FOR IP): Performed by: INTERNAL MEDICINE

## 2024-08-03 PROCEDURE — 2580000003 HC RX 258: Performed by: INTERNAL MEDICINE

## 2024-08-03 RX ADMIN — SODIUM CHLORIDE, PRESERVATIVE FREE 10 ML: 5 INJECTION INTRAVENOUS at 08:34

## 2024-08-03 RX ADMIN — AMPICILLIN SODIUM, SULBACTAM SODIUM 3000 MG: 2; 1 INJECTION, POWDER, FOR SOLUTION INTRAMUSCULAR; INTRAVENOUS at 04:43

## 2024-08-03 RX ADMIN — DULOXETINE HYDROCHLORIDE 120 MG: 60 CAPSULE, DELAYED RELEASE ORAL at 08:34

## 2024-08-03 RX ADMIN — KETOROLAC TROMETHAMINE 15 MG: 15 INJECTION, SOLUTION INTRAMUSCULAR; INTRAVENOUS at 12:57

## 2024-08-03 RX ADMIN — KETOROLAC TROMETHAMINE 15 MG: 15 INJECTION, SOLUTION INTRAMUSCULAR; INTRAVENOUS at 05:15

## 2024-08-03 RX ADMIN — AMPICILLIN SODIUM, SULBACTAM SODIUM 3000 MG: 2; 1 INJECTION, POWDER, FOR SOLUTION INTRAMUSCULAR; INTRAVENOUS at 12:54

## 2024-08-03 ASSESSMENT — PAIN DESCRIPTION - DESCRIPTORS
DESCRIPTORS: ACHING
DESCRIPTORS: ACHING;TENDER;SORE

## 2024-08-03 ASSESSMENT — PAIN DESCRIPTION - ORIENTATION
ORIENTATION: LEFT
ORIENTATION: LEFT

## 2024-08-03 ASSESSMENT — PAIN SCALES - GENERAL
PAINLEVEL_OUTOF10: 7
PAINLEVEL_OUTOF10: 7

## 2024-08-03 ASSESSMENT — PAIN DESCRIPTION - LOCATION
LOCATION: WRIST
LOCATION: ARM

## 2024-08-03 NOTE — PLAN OF CARE
Problem: Discharge Planning  Goal: Discharge to home or other facility with appropriate resources  8/2/2024 2234 by Olive Vasquez, RN  Outcome: Progressing  8/2/2024 1204 by Jackie Robbins, RN  Outcome: Progressing     Problem: Pain  Goal: Verbalizes/displays adequate comfort level or baseline comfort level  8/2/2024 2234 by Olive Vasquez, RN  Outcome: Progressing  8/2/2024 1204 by Jackie Robbins, RN  Outcome: Progressing     Problem: Safety - Adult  Goal: Free from fall injury  8/2/2024 2234 by Olive Vasquez, RN  Outcome: Progressing  8/2/2024 1204 by Jackie Robbins, RN  Outcome: Progressing

## 2024-08-03 NOTE — PROGRESS NOTES
Infectious Diseases Inpatient Progress Note          HISTORY OF PRESENT ILLNESS:  Follow up left upper extremity cellulitis following cat bite on IV Unasyn, well tolerated. Patient had remarkable clinical improvement with decreased left arm redness and pain.  Resolved the drainage since admission.  No nausea vomiting or diarrhea.  No rash or mouth soreness.  No leg swelling.  No fevers or chills.    Current Medications:     nicotine  1 patch TransDERmal Daily    hydrOXYzine pamoate  50 mg Oral Nightly    DULoxetine  120 mg Oral Daily    sodium chloride flush  5-40 mL IntraVENous 2 times per day    [Held by provider] enoxaparin  40 mg SubCUTAneous Daily    ampicillin-sulbactam  3,000 mg IntraVENous Q6H       Allergies:  Demerol, Meperidine, and Sulfa antibiotics      Review of Systems  Rest of system review is negative other than HPI    Physical Exam  Vitals:    08/02/24 0814 08/02/24 1918 08/02/24 2115 08/02/24 2348   BP: 131/86 (!) 108/94 127/64 134/67   Pulse: 64 75 70 55   Resp: 16 18 18 16   Temp: 98 °F (36.7 °C) 98.1 °F (36.7 °C) 98.1 °F (36.7 °C) 97.3 °F (36.3 °C)   TempSrc: Oral Oral Oral    SpO2:  95% 96% 98%   Weight:       Height:         General Appearance: alert and oriented to person, place and time, well-developed and well-nourished, in no acute distress  Skin: warm and dry, no rash.   Head: normocephalic and atraumatic  Eyes: anicteric sclerae  ENT:  normal mucous membranes.   Lungs: normal respiratory effort  Abdomen: soft, no tenderness  No leg edema  No erythema, no tenderness  Left forearm and hand with resolved erythema except for a small area located over anterior medial wrist  Dry cat bite sites over left wrist area  No obvious abscess  Normal range of motion of wrist.  Normal fist    DATA:    Lab Results   Component Value Date    WBC 5.5 08/03/2024    HGB 12.7 08/03/2024    HCT 37.1 08/03/2024    MCV 88.5 08/03/2024     08/03/2024     Lab Results   Component Value Date    CREATININE  0.51 08/03/2024    BUN 15 08/03/2024     08/03/2024    K 4.0 08/03/2024     08/03/2024    CO2 22 08/03/2024       Hepatic Function Panel:  Lab Results   Component Value Date/Time    ALKPHOS 73 07/31/2024 03:31 PM    ALT 17 07/31/2024 03:31 PM    AST 19 07/31/2024 03:31 PM    BILITOT 0.4 07/31/2024 03:31 PM       Microbiology:   Recent Labs     07/31/24  1531   BC No Growth to date.  Any change in status will be called.     Recent Labs     07/31/24  1615   BLOODCULT2 No Growth to date.  Any change in status will be called.       IMPRESSION:    Left upper extremity cellulitis, much improved on IV Unasyn  Cat bite        PLAN:  May discharge on Augmentin 875 mg twice daily for 1 week  Patient was instructed to call my office if she has worsening in her condition or if she has any side effects related to the antibiotics  Patient was instructed to be careful around her cat, may need to consider updating cats vaccination.  Patient reports that the cat does not go outdoor    Discussed with patient and Dr Isela Mckeon MD

## 2024-08-03 NOTE — PROGRESS NOTES
Hospitalist Progress Note      PCP: Erna Pond, APRN - CNP    Date of Admission: 7/31/2024    Chief Complaint:  no acute events, afebrile, stable HD, feels better    Medications:  Reviewed    Infusion Medications    sodium chloride       Scheduled Medications    nicotine  1 patch TransDERmal Daily    hydrOXYzine pamoate  50 mg Oral Nightly    DULoxetine  120 mg Oral Daily    sodium chloride flush  5-40 mL IntraVENous 2 times per day    [Held by provider] enoxaparin  40 mg SubCUTAneous Daily    ampicillin-sulbactam  3,000 mg IntraVENous Q6H     PRN Meds: ketorolac, sodium chloride flush, sodium chloride, potassium chloride **OR** potassium alternative oral replacement **OR** potassium chloride, magnesium sulfate, ondansetron **OR** ondansetron, polyethylene glycol, acetaminophen **OR** acetaminophen    No intake or output data in the 24 hours ending 08/03/24 1000      Exam:    /67   Pulse 55   Temp 97.3 °F (36.3 °C)   Resp 16   Ht 1.702 m (5' 7\")   Wt 81.6 kg (180 lb)   LMP 07/14/2020   SpO2 98%   BMI 28.19 kg/m²     General appearance: alert, cooperative  Lungs: clear to auscultation bilaterally  Heart: S1/S2,RRR  Abdomen: soft, active BS  Extremities: left wrist / forearm erythema almost resolved    Labs:   Recent Labs     08/01/24  0512 08/02/24  0548 08/03/24  0623   WBC 7.1 6.2 5.5   HGB 12.8 13.4 12.7   HCT 37.9 39.5 37.1    179 196       Recent Labs     08/01/24  0512 08/02/24  0548 08/03/24  0623    142 139   K 3.4 3.7 4.0   * 109* 106   CO2 23 24 22   BUN 8 11 15   CREATININE 0.47* 0.50 0.51   CALCIUM 8.9 8.8 8.9   PHOS 3.5 3.5 4.2       Recent Labs     07/31/24  1531   AST 19   ALT 17   BILITOT 0.4   ALKPHOS 73       No results for input(s): \"INR\" in the last 72 hours.  No results for input(s): \"CKTOTAL\", \"TROPONINI\" in the last 72 hours.    Urinalysis:      Lab Results   Component Value Date/Time    NITRU Negative 05/14/2017 06:15 PM    WBCUA 6-10 05/14/2017 06:15

## 2024-08-05 ENCOUNTER — TELEPHONE (OUTPATIENT)
Dept: FAMILY MEDICINE CLINIC | Age: 54
End: 2024-08-05

## 2024-08-05 LAB
BACTERIA BLD CULT ORG #2: NORMAL
BACTERIA BLD CULT: NORMAL

## 2024-08-05 NOTE — TELEPHONE ENCOUNTER
Care Transitions Initial Follow Up Call    Outreach made within 2 business days of discharge: Yes    Patient: Lorene Pina Patient : 1970   MRN: [id number]  Reason for Admission: Cellulitis of left hand   Discharge Date: 8/3/24       Spoke with: DAMIAN    Discharge department/facility: [place]      Additional needs identified to be addressed with provider  LM             Scheduled appointment with PCP within 7-14 days    Follow Up  No future appointments.    Natty Coronado MA

## 2024-08-06 NOTE — TELEPHONE ENCOUNTER
Care Transitions Initial Follow Up Call    Outreach made within 2 business days of discharge: Yes    Patient: Lorene Pina Patient : 1970   MRN: [id number]  Reason for Admission: Cellulitis of left hand   Discharge Date: 8/3/24       Spoke with: DAMIAN    Discharge department[facility]facility: [facility]        Scheduled appointment with PCP within 7-14 days    Follow Up  No future appointments.    Natty Coronado MA

## 2024-08-08 ENCOUNTER — TELEPHONE (OUTPATIENT)
Dept: FAMILY MEDICINE CLINIC | Age: 54
End: 2024-08-08

## 2024-11-07 ENCOUNTER — TELEPHONE (OUTPATIENT)
Dept: FAMILY MEDICINE CLINIC | Age: 54
End: 2024-11-07